# Patient Record
Sex: FEMALE | Race: WHITE | Employment: UNEMPLOYED | URBAN - METROPOLITAN AREA
[De-identification: names, ages, dates, MRNs, and addresses within clinical notes are randomized per-mention and may not be internally consistent; named-entity substitution may affect disease eponyms.]

---

## 2017-07-06 ENCOUNTER — OFFICE VISIT (OUTPATIENT)
Dept: FAMILY MEDICINE CLINIC | Age: 42
End: 2017-07-06

## 2017-07-06 VITALS
HEIGHT: 62 IN | DIASTOLIC BLOOD PRESSURE: 86 MMHG | HEART RATE: 65 BPM | WEIGHT: 205.4 LBS | OXYGEN SATURATION: 96 % | SYSTOLIC BLOOD PRESSURE: 120 MMHG | BODY MASS INDEX: 37.8 KG/M2

## 2017-07-06 DIAGNOSIS — Z23 NEED FOR PROPHYLACTIC VACCINATION AGAINST DIPHTHERIA-TETANUS-PERTUSSIS (DTP): ICD-10-CM

## 2017-07-06 DIAGNOSIS — K90.41 GLUTEN INTOLERANCE: ICD-10-CM

## 2017-07-06 DIAGNOSIS — L65.9 HAIR LOSS: ICD-10-CM

## 2017-07-06 DIAGNOSIS — E66.9 OBESITY, UNSPECIFIED OBESITY SEVERITY, UNSPECIFIED OBESITY TYPE: ICD-10-CM

## 2017-07-06 DIAGNOSIS — Z00.00 PHYSICAL EXAM, ROUTINE: Primary | ICD-10-CM

## 2017-07-06 DIAGNOSIS — Z12.31 SCREENING MAMMOGRAM, ENCOUNTER FOR: ICD-10-CM

## 2017-07-06 DIAGNOSIS — K21.9 GASTROESOPHAGEAL REFLUX DISEASE WITHOUT ESOPHAGITIS: ICD-10-CM

## 2017-07-06 DIAGNOSIS — E28.2 PCOS (POLYCYSTIC OVARIAN SYNDROME): ICD-10-CM

## 2017-07-06 DIAGNOSIS — F33.40 RECURRENT MAJOR DEPRESSIVE DISORDER, IN REMISSION (HCC): ICD-10-CM

## 2017-07-06 DIAGNOSIS — E78.5 HYPERLIPIDEMIA, UNSPECIFIED HYPERLIPIDEMIA TYPE: ICD-10-CM

## 2017-07-06 PROCEDURE — 90471 IMMUNIZATION ADMIN: CPT | Performed by: FAMILY MEDICINE

## 2017-07-06 PROCEDURE — 90715 TDAP VACCINE 7 YRS/> IM: CPT | Performed by: FAMILY MEDICINE

## 2017-07-06 PROCEDURE — 99385 PREV VISIT NEW AGE 18-39: CPT | Performed by: FAMILY MEDICINE

## 2017-07-06 RX ORDER — DESVENLAFAXINE 100 MG/1
TABLET, EXTENDED RELEASE ORAL
COMMUNITY
Start: 2017-06-13 | End: 2018-03-08 | Stop reason: SDUPTHER

## 2017-07-06 RX ORDER — SPIRONOLACTONE 50 MG/1
50 TABLET, FILM COATED ORAL
COMMUNITY
Start: 2017-02-21 | End: 2017-08-29 | Stop reason: SDUPTHER

## 2017-07-06 RX ORDER — CETIRIZINE HYDROCHLORIDE 10 MG/1
10 TABLET ORAL
COMMUNITY
End: 2022-02-18

## 2017-07-06 RX ORDER — TRAZODONE HYDROCHLORIDE 100 MG/1
100 TABLET ORAL
COMMUNITY
Start: 2017-01-03 | End: 2018-03-01 | Stop reason: SDUPTHER

## 2017-07-06 ASSESSMENT — PATIENT HEALTH QUESTIONNAIRE - PHQ9
SUM OF ALL RESPONSES TO PHQ QUESTIONS 1-9: 0
1. LITTLE INTEREST OR PLEASURE IN DOING THINGS: 0
2. FEELING DOWN, DEPRESSED OR HOPELESS: 0
SUM OF ALL RESPONSES TO PHQ9 QUESTIONS 1 & 2: 0

## 2017-08-29 ENCOUNTER — TELEPHONE (OUTPATIENT)
Dept: FAMILY MEDICINE CLINIC | Age: 42
End: 2017-08-29

## 2017-08-29 RX ORDER — SPIRONOLACTONE 50 MG/1
50 TABLET, FILM COATED ORAL DAILY
Qty: 30 TABLET | Refills: 2 | Status: SHIPPED | OUTPATIENT
Start: 2017-08-29 | End: 2017-09-05 | Stop reason: SDUPTHER

## 2017-09-05 RX ORDER — SPIRONOLACTONE 50 MG/1
50 TABLET, FILM COATED ORAL 2 TIMES DAILY
Qty: 180 TABLET | Refills: 0 | Status: SHIPPED | OUTPATIENT
Start: 2017-09-05 | End: 2017-12-05 | Stop reason: SDUPTHER

## 2017-12-05 RX ORDER — SPIRONOLACTONE 50 MG/1
50 TABLET, FILM COATED ORAL 2 TIMES DAILY
Qty: 180 TABLET | Refills: 1 | Status: SHIPPED | OUTPATIENT
Start: 2017-12-05 | End: 2018-05-27 | Stop reason: SDUPTHER

## 2018-01-08 ENCOUNTER — HOSPITAL ENCOUNTER (OUTPATIENT)
Dept: MAMMOGRAPHY | Age: 43
Discharge: HOME | End: 2018-01-08
Attending: FAMILY MEDICINE | Admitting: FAMILY MEDICINE

## 2018-01-08 DIAGNOSIS — Z12.31 SCREENING MAMMOGRAM, ENCOUNTER FOR: ICD-10-CM

## 2018-01-22 LAB
HCT VFR BLD CALC: 47.3 % (ref 36–48)
HEMOGLOBIN: 15.6 G/DL (ref 12–16)
MCH RBC QN AUTO: 31.7 PG (ref 26–34)
MCHC RBC AUTO-ENTMCNC: 32.9 G/DL (ref 31–36)
MCV RBC AUTO: 96.4 FL (ref 80–100)
PDW BLD-RTO: 13.9 % (ref 12.4–15.4)
PLATELET # BLD: 313 K/UL (ref 135–450)
PMV BLD AUTO: 8.1 FL (ref 5–10.5)
RBC # BLD: 4.91 M/UL (ref 4–5.2)
TSH SERPL DL<=0.05 MIU/L-ACNC: 1.64 UIU/ML (ref 0.27–4.2)
WBC # BLD: 11 K/UL (ref 4–11)

## 2018-01-24 LAB — TESTOSTERONE TOTAL: 41 NG/DL (ref 20–70)

## 2018-01-25 LAB
HPV COMMENT: NORMAL
HPV TYPE 16: NOT DETECTED
HPV TYPE 18: NOT DETECTED
HPVOH (OTHER TYPES): NOT DETECTED

## 2018-03-01 ENCOUNTER — TELEPHONE (OUTPATIENT)
Dept: FAMILY MEDICINE CLINIC | Age: 43
End: 2018-03-01

## 2018-03-01 RX ORDER — TRAZODONE HYDROCHLORIDE 100 MG/1
100 TABLET ORAL NIGHTLY
Qty: 30 TABLET | Refills: 0 | Status: SHIPPED | OUTPATIENT
Start: 2018-03-01 | End: 2018-03-28 | Stop reason: SDUPTHER

## 2018-03-08 ENCOUNTER — TELEPHONE (OUTPATIENT)
Dept: FAMILY MEDICINE CLINIC | Age: 43
End: 2018-03-08

## 2018-03-08 RX ORDER — DESVENLAFAXINE 100 MG/1
100 TABLET, EXTENDED RELEASE ORAL DAILY
Qty: 90 TABLET | Refills: 1 | Status: SHIPPED | OUTPATIENT
Start: 2018-03-08 | End: 2018-04-19 | Stop reason: SDUPTHER

## 2018-03-28 ENCOUNTER — TELEPHONE (OUTPATIENT)
Dept: FAMILY MEDICINE CLINIC | Age: 43
End: 2018-03-28

## 2018-03-28 RX ORDER — TRAZODONE HYDROCHLORIDE 100 MG/1
100 TABLET ORAL NIGHTLY
Qty: 30 TABLET | Refills: 0 | Status: SHIPPED | OUTPATIENT
Start: 2018-03-28 | End: 2018-04-19 | Stop reason: SDUPTHER

## 2018-04-05 ENCOUNTER — TELEPHONE (OUTPATIENT)
Dept: FAMILY MEDICINE CLINIC | Age: 43
End: 2018-04-05

## 2018-04-19 ENCOUNTER — OFFICE VISIT (OUTPATIENT)
Dept: FAMILY MEDICINE CLINIC | Age: 43
End: 2018-04-19

## 2018-04-19 VITALS
WEIGHT: 214.8 LBS | HEART RATE: 82 BPM | SYSTOLIC BLOOD PRESSURE: 132 MMHG | HEIGHT: 62 IN | DIASTOLIC BLOOD PRESSURE: 90 MMHG | OXYGEN SATURATION: 96 % | BODY MASS INDEX: 39.53 KG/M2

## 2018-04-19 DIAGNOSIS — F33.40 RECURRENT MAJOR DEPRESSIVE DISORDER, IN REMISSION (HCC): Primary | ICD-10-CM

## 2018-04-19 DIAGNOSIS — G47.00 INSOMNIA, UNSPECIFIED TYPE: ICD-10-CM

## 2018-04-19 DIAGNOSIS — E28.2 PCOS (POLYCYSTIC OVARIAN SYNDROME): ICD-10-CM

## 2018-04-19 DIAGNOSIS — E66.9 OBESITY WITHOUT SERIOUS COMORBIDITY, UNSPECIFIED CLASSIFICATION, UNSPECIFIED OBESITY TYPE: ICD-10-CM

## 2018-04-19 DIAGNOSIS — E78.5 HYPERLIPIDEMIA, UNSPECIFIED HYPERLIPIDEMIA TYPE: ICD-10-CM

## 2018-04-19 PROCEDURE — 99213 OFFICE O/P EST LOW 20 MIN: CPT | Performed by: FAMILY MEDICINE

## 2018-04-19 RX ORDER — TRAZODONE HYDROCHLORIDE 100 MG/1
100 TABLET ORAL NIGHTLY
Qty: 30 TABLET | Refills: 2 | Status: SHIPPED | OUTPATIENT
Start: 2018-04-19

## 2018-04-19 RX ORDER — DESVENLAFAXINE 100 MG/1
100 TABLET, EXTENDED RELEASE ORAL DAILY
Qty: 90 TABLET | Refills: 1 | Status: SHIPPED | OUTPATIENT
Start: 2018-04-19 | End: 2018-10-19 | Stop reason: SDUPTHER

## 2018-05-29 RX ORDER — SPIRONOLACTONE 50 MG/1
50 TABLET, FILM COATED ORAL 2 TIMES DAILY
Qty: 180 TABLET | Refills: 0 | Status: SHIPPED | OUTPATIENT
Start: 2018-05-29 | End: 2018-08-30 | Stop reason: SDUPTHER

## 2018-07-19 ENCOUNTER — HOSPITAL ENCOUNTER (OUTPATIENT)
Dept: OTHER | Age: 43
Discharge: HOME | End: 2018-07-19
Attending: FAMILY MEDICINE | Admitting: FAMILY MEDICINE

## 2018-07-19 DIAGNOSIS — E28.2 PCOS (POLYCYSTIC OVARIAN SYNDROME): ICD-10-CM

## 2018-07-19 DIAGNOSIS — E78.5 HYPERLIPIDEMIA, UNSPECIFIED HYPERLIPIDEMIA TYPE: ICD-10-CM

## 2018-07-19 LAB
A/G RATIO: 1.6 (ref 1.1–2.2)
ALBUMIN SERPL-MCNC: 4.3 G/DL (ref 3.4–5)
ALP BLD-CCNC: 53 U/L (ref 40–129)
ALT SERPL-CCNC: 9 U/L (ref 10–40)
ANION GAP SERPL CALCULATED.3IONS-SCNC: 12 MMOL/L (ref 3–16)
AST SERPL-CCNC: 15 U/L (ref 15–37)
BILIRUB SERPL-MCNC: <0.2 MG/DL (ref 0–1)
BUN BLDV-MCNC: 19 MG/DL (ref 7–20)
CALCIUM SERPL-MCNC: 9.3 MG/DL (ref 8.3–10.6)
CHLORIDE BLD-SCNC: 104 MMOL/L (ref 99–110)
CHOLESTEROL, FASTING: 154 MG/DL (ref 0–199)
CO2: 27 MMOL/L (ref 21–32)
CREAT SERPL-MCNC: 0.8 MG/DL (ref 0.6–1.1)
GFR AFRICAN AMERICAN: >60
GFR NON-AFRICAN AMERICAN: >60
GLOBULIN: 2.7 G/DL
GLUCOSE BLD-MCNC: 80 MG/DL (ref 70–99)
HDLC SERPL-MCNC: 47 MG/DL (ref 40–60)
LDL CHOLESTEROL CALCULATED: 93 MG/DL
POTASSIUM SERPL-SCNC: 4.5 MMOL/L (ref 3.5–5.1)
SODIUM BLD-SCNC: 143 MMOL/L (ref 136–145)
TOTAL PROTEIN: 7 G/DL (ref 6.4–8.2)
TRIGLYCERIDE, FASTING: 72 MG/DL (ref 0–150)
TSH REFLEX: 3.97 UIU/ML (ref 0.27–4.2)
VLDLC SERPL CALC-MCNC: 14 MG/DL

## 2018-07-31 ENCOUNTER — OFFICE VISIT (OUTPATIENT)
Dept: FAMILY MEDICINE CLINIC | Age: 43
End: 2018-07-31

## 2018-07-31 VITALS
OXYGEN SATURATION: 97 % | SYSTOLIC BLOOD PRESSURE: 112 MMHG | BODY MASS INDEX: 39.16 KG/M2 | DIASTOLIC BLOOD PRESSURE: 88 MMHG | HEIGHT: 62 IN | HEART RATE: 80 BPM | WEIGHT: 212.8 LBS

## 2018-07-31 DIAGNOSIS — E66.9 OBESITY WITHOUT SERIOUS COMORBIDITY, UNSPECIFIED CLASSIFICATION, UNSPECIFIED OBESITY TYPE: ICD-10-CM

## 2018-07-31 DIAGNOSIS — F33.40 RECURRENT MAJOR DEPRESSIVE DISORDER, IN REMISSION (HCC): ICD-10-CM

## 2018-07-31 DIAGNOSIS — E28.2 PCOS (POLYCYSTIC OVARIAN SYNDROME): ICD-10-CM

## 2018-07-31 DIAGNOSIS — L30.4 INTERTRIGO: ICD-10-CM

## 2018-07-31 DIAGNOSIS — Z00.00 PHYSICAL EXAM, ROUTINE: Primary | ICD-10-CM

## 2018-07-31 PROCEDURE — 99396 PREV VISIT EST AGE 40-64: CPT | Performed by: FAMILY MEDICINE

## 2018-07-31 RX ORDER — NYSTATIN 100000 [USP'U]/G
POWDER TOPICAL
Qty: 1 BOTTLE | Refills: 3 | Status: SHIPPED | OUTPATIENT
Start: 2018-07-31 | End: 2019-10-09

## 2018-07-31 RX ORDER — BUPROPION HYDROCHLORIDE 150 MG/1
TABLET ORAL
COMMUNITY
Start: 2018-07-24 | End: 2019-10-09 | Stop reason: DRUGHIGH

## 2018-07-31 NOTE — PROGRESS NOTES
Lipid panel:   Lab Results   Component Value Date    HDL 47 07/19/2018    LDLCALC 93 07/19/2018        Immunization History   Administered Date(s) Administered    DT 11/13/2014    Tdap (Boostrix, Adacel) 07/06/2017       Allergies   Allergen Reactions    Lamotrigine Rash    Penicillins Hives and Rash    Sulfa Antibiotics Rash    Gluten Meal     Penciclovir Rash     Outpatient Prescriptions Marked as Taking for the 7/31/18 encounter (Office Visit) with Jose Spangler MD   Medication Sig Dispense Refill    buPROPion (WELLBUTRIN XL) 150 MG extended release tablet       spironolactone (ALDACTONE) 50 MG tablet TAKE 1 TABLET BY MOUTH 2 TIMES DAILY 180 tablet 0    traZODone (DESYREL) 100 MG tablet Take 1 tablet by mouth nightly 30 tablet 2    desvenlafaxine succinate (PRISTIQ) 100 MG TB24 extended release tablet Take 1 tablet by mouth daily 90 tablet 1    cetirizine (ZYRTEC) 10 MG tablet Take 10 mg by mouth         No past medical history on file. Past Surgical History:   Procedure Laterality Date    INCONTINENCE SURGERY      OVARIAN CYST REMOVAL Right     PLANTAR FASCIA SURGERY Bilateral      Family History   Problem Relation Age of Onset    High Blood Pressure Mother     Thyroid Disease Mother     Anxiety Disorder Mother     Liver Disease Father 61    Diabetes Father     Colon Cancer Maternal Grandmother         Mid 45s    Heart Disease Maternal Grandfather     Diabetes Maternal Grandfather     Heart Disease Paternal Grandmother     Diabetes Paternal Grandmother     Breast Cancer Paternal Grandmother 61    Breast Cancer Paternal Aunt 72    Other Daughter         High functioning autism and ADHD     Social History     Social History    Marital status:      Spouse name: N/A    Number of children: N/A    Years of education: N/A     Occupational History    Not on file.      Social History Main Topics    Smoking status: Former Smoker     Packs/day: 1.00     Years: 8.00 erythema in axillary creases bilaterally. No suspicious lesions noted. Psychiatric: She has a normal mood and affect. Her speech is normal and behavior is normal. Judgment, cognition and memory are normal.     Assessment/Plan:    Ivet Ortega was seen today for annual exam.    Diagnoses and all orders for this visit:    Physical exam, routine    Recurrent major depressive disorder, in remission ([facility] [address]    PCOS (polycystic ovarian syndrome)    Obesity without serious comorbidity, unspecified classification, unspecified obesity type    Intertrigo  -     nystatin (MYCOSTATIN) 660771 UNIT/GM powder; Apply 3 times daily. Labs 7/19/2018 reviewed with patient.

## 2018-08-30 RX ORDER — SPIRONOLACTONE 50 MG/1
TABLET, FILM COATED ORAL
Qty: 180 TABLET | Refills: 3 | Status: SHIPPED | OUTPATIENT
Start: 2018-08-30 | End: 2019-09-09 | Stop reason: SDUPTHER

## 2018-10-19 DIAGNOSIS — F33.40 RECURRENT MAJOR DEPRESSIVE DISORDER, IN REMISSION (HCC): Primary | ICD-10-CM

## 2018-10-19 RX ORDER — DESVENLAFAXINE 100 MG/1
100 TABLET, EXTENDED RELEASE ORAL DAILY
Qty: 90 TABLET | Refills: 2 | Status: SHIPPED | OUTPATIENT
Start: 2018-10-19 | End: 2022-02-18

## 2019-03-12 ENCOUNTER — HOSPITAL ENCOUNTER (OUTPATIENT)
Dept: WOMENS IMAGING | Age: 44
Discharge: HOME | End: 2019-03-12
Payer: COMMERCIAL

## 2019-03-12 DIAGNOSIS — Z12.31 BREAST CANCER SCREENING BY MAMMOGRAM: ICD-10-CM

## 2019-03-12 PROCEDURE — 77063 BREAST TOMOSYNTHESIS BI: CPT

## 2019-09-09 ENCOUNTER — TELEPHONE (OUTPATIENT)
Dept: FAMILY MEDICINE CLINIC | Age: 44
End: 2019-09-09

## 2019-09-09 RX ORDER — SPIRONOLACTONE 50 MG/1
TABLET, FILM COATED ORAL
Qty: 180 TABLET | Refills: 1 | Status: SHIPPED | OUTPATIENT
Start: 2019-09-09 | End: 2019-09-09 | Stop reason: SDUPTHER

## 2019-09-09 RX ORDER — SPIRONOLACTONE 50 MG/1
TABLET, FILM COATED ORAL
Qty: 60 TABLET | Refills: 0 | Status: CANCELLED | OUTPATIENT
Start: 2019-09-09

## 2019-09-09 RX ORDER — SPIRONOLACTONE 50 MG/1
TABLET, FILM COATED ORAL
Qty: 180 TABLET | Refills: 1 | Status: SHIPPED | OUTPATIENT
Start: 2019-09-09 | End: 2020-03-13 | Stop reason: SDUPTHER

## 2019-10-09 ENCOUNTER — OFFICE VISIT (OUTPATIENT)
Dept: FAMILY MEDICINE CLINIC | Age: 44
End: 2019-10-09
Payer: COMMERCIAL

## 2019-10-09 VITALS
DIASTOLIC BLOOD PRESSURE: 90 MMHG | BODY MASS INDEX: 39.05 KG/M2 | HEIGHT: 62 IN | HEART RATE: 84 BPM | OXYGEN SATURATION: 98 % | WEIGHT: 212.2 LBS | SYSTOLIC BLOOD PRESSURE: 120 MMHG

## 2019-10-09 DIAGNOSIS — F33.40 RECURRENT MAJOR DEPRESSIVE DISORDER, IN REMISSION (HCC): ICD-10-CM

## 2019-10-09 DIAGNOSIS — Z00.00 ANNUAL PHYSICAL EXAM: Primary | ICD-10-CM

## 2019-10-09 DIAGNOSIS — E28.2 POLYCYSTIC OVARIAN SYNDROME: ICD-10-CM

## 2019-10-09 PROCEDURE — 99386 PREV VISIT NEW AGE 40-64: CPT | Performed by: FAMILY MEDICINE

## 2019-10-09 RX ORDER — BUPROPION HYDROCHLORIDE 300 MG/1
TABLET ORAL
COMMUNITY
Start: 2019-07-24 | End: 2020-08-25

## 2019-11-04 LAB
ALBUMIN SERPL-MCNC: 4.2 G/DL
ALP BLD-CCNC: 61 U/L
ALT SERPL-CCNC: 11 U/L
ANION GAP SERPL CALCULATED.3IONS-SCNC: 1.7 MMOL/L
AST SERPL-CCNC: 15 U/L
AVERAGE GLUCOSE: NORMAL
BASOPHILS ABSOLUTE: 0 /ΜL
BASOPHILS RELATIVE PERCENT: 0 %
BILIRUB SERPL-MCNC: 0.3 MG/DL (ref 0.1–1.4)
BUN BLDV-MCNC: 12 MG/DL
CALCIUM SERPL-MCNC: 9.4 MG/DL
CHLORIDE BLD-SCNC: 105 MMOL/L
CHOLESTEROL, TOTAL: 144 MG/DL
CHOLESTEROL/HDL RATIO: NORMAL
CO2: 23 MMOL/L
CREAT SERPL-MCNC: 0.92 MG/DL
EOSINOPHILS ABSOLUTE: 0.1 /ΜL
EOSINOPHILS RELATIVE PERCENT: 1 %
GFR CALCULATED: NORMAL
GLUCOSE BLD-MCNC: 86 MG/DL
HBA1C MFR BLD: 5.3 %
HCT VFR BLD CALC: 46.5 % (ref 36–46)
HDLC SERPL-MCNC: 48 MG/DL (ref 35–70)
HEMOGLOBIN: 15.8 G/DL (ref 12–16)
LDL CHOLESTEROL CALCULATED: 73 MG/DL (ref 0–160)
LYMPHOCYTES ABSOLUTE: 1.6 /ΜL
LYMPHOCYTES RELATIVE PERCENT: 16 %
MCH RBC QN AUTO: 32.4 PG
MCHC RBC AUTO-ENTMCNC: 34 G/DL
MCV RBC AUTO: 96 FL
MONOCYTES ABSOLUTE: 0.8 /ΜL
MONOCYTES RELATIVE PERCENT: 8 %
NEUTROPHILS ABSOLUTE: 7.5 /ΜL
NEUTROPHILS RELATIVE PERCENT: 75 %
PLATELET # BLD: 309 K/ΜL
PMV BLD AUTO: ABNORMAL FL
POTASSIUM SERPL-SCNC: 4.6 MMOL/L
RBC # BLD: 4.87 10^6/ΜL
SODIUM BLD-SCNC: 145 MMOL/L
TOTAL PROTEIN: 6.7
TRIGL SERPL-MCNC: 117 MG/DL
VLDLC SERPL CALC-MCNC: 23 MG/DL
WBC # BLD: 10.1 10^3/ML

## 2020-01-09 ENCOUNTER — OFFICE VISIT (OUTPATIENT)
Dept: FAMILY MEDICINE CLINIC | Age: 45
End: 2020-01-09
Payer: COMMERCIAL

## 2020-01-09 VITALS
DIASTOLIC BLOOD PRESSURE: 86 MMHG | BODY MASS INDEX: 38.46 KG/M2 | OXYGEN SATURATION: 98 % | HEART RATE: 84 BPM | SYSTOLIC BLOOD PRESSURE: 120 MMHG | TEMPERATURE: 98.1 F | WEIGHT: 212 LBS

## 2020-01-09 PROCEDURE — 99213 OFFICE O/P EST LOW 20 MIN: CPT | Performed by: FAMILY MEDICINE

## 2020-01-09 RX ORDER — FLUTICASONE PROPIONATE 50 MCG
1 SPRAY, SUSPENSION (ML) NASAL DAILY
Qty: 1 BOTTLE | Refills: 1 | Status: SHIPPED | OUTPATIENT
Start: 2020-01-09 | End: 2021-05-10

## 2020-01-09 RX ORDER — AZITHROMYCIN 250 MG/1
TABLET, FILM COATED ORAL
Qty: 6 TABLET | Refills: 0 | Status: SHIPPED | OUTPATIENT
Start: 2020-01-09 | End: 2020-08-25 | Stop reason: ALTCHOICE

## 2020-01-09 NOTE — PROGRESS NOTES
[address] Note    Date: 1/9/2020                                               Subjective/Objective:     Chief Complaint   Patient presents with    Fever         Sinusitis     yellow muscus    Fatigue       HPI   4 day hx of fatigue, sinus pressure, cough. +fever up to 101. Stable in severity. Patient Active Problem List    Diagnosis Date Noted    PCOS (polycystic ovarian syndrome) 07/06/2017    Gluten intolerance 07/06/2017    Recurrent major depressive disorder, in remission ([facility] [address] 07/06/2017    Hair loss 07/06/2017    Gastroesophageal reflux disease without esophagitis 07/06/2017    Hyperlipidemia 07/06/2017    Obesity 07/06/2017       Past Medical History:   Diagnosis Date    Depression     Female pattern hair loss        Current Outpatient Medications   Medication Sig Dispense Refill    azithromycin (ZITHROMAX Z-YOVANI) 250 MG tablet Take 2 tablets on day one and 1 tablet daily on days 2-5 6 tablet 0    fluticasone (FLONASE) 50 MCG/ACT nasal spray 1 spray by Nasal route daily 1 Bottle 1    buPROPion (WELLBUTRIN XL) 300 MG extended release tablet       Biotin 5000 MCG CAPS Take 1 capsule by mouth daily      Ferrous Gluconate (IRON 27 PO) Take 1 tablet by mouth daily      spironolactone (ALDACTONE) 50 MG tablet TAKE 1 TABLET BY MOUTH TWICE A  tablet 1    desvenlafaxine succinate (PRISTIQ) 100 MG TB24 extended release tablet TAKE 1 TABLET BY MOUTH DAILY 90 tablet 2    traZODone (DESYREL) 100 MG tablet Take 1 tablet by mouth nightly (Patient taking differently: Take 50 mg by mouth nightly ) 30 tablet 2    cetirizine (ZYRTEC) 10 MG tablet Take 10 mg by mouth       No current facility-administered medications for this visit.         Allergies   Allergen Reactions    Lamotrigine Rash    Penicillins Hives and Rash    Sulfa Antibiotics Rash    Gluten Meal     Penciclovir Rash       Review of Systems   No SOB, no vomiting    Vitals:  /86   Pulse 84 Temp 98.1 °F (36.7 °C)   Wt 212 lb (96.2 kg)   SpO2 98%   BMI 38.46 kg/m²     Physical Exam   General:  +acutely ill, NAD, alert, non-toxic  HEENT:  Normocephalic, atraumatic. Pupils equal and round. Moist mucous membranes. Normal dentition. No pharyngeal erythema, no exudates  NECK:  Supple, normal range of motion, no LAD, no meningeal signs  CHEST/LUNGS: CTAB, no crackles, no wheeze, no rhonchi. Symmetric rise  CARDIOVASCULAR: RRR,  no murmur, no rub, pulses 2+  EXTREMETIES: Normal movement of all extremities. No edema. No joint swelling. NEURO:  GCS 15, CN2-12 grossly intact, no focal motor/sensory deficits, no cerebellar deficits, normal gait, normal speech      Assessment/Plan     45 y. o.yo female with acute sinusitis. Will treat with Azithromycin, flonase, anti-histamine. Discussed with patient medication/s dosage, instructions, potential S/E, A/R and Drug Interaction  Educational material provided regarding patient's condition  Tylenol or Motrin as needed for pain or fever  Advise to return here if worse or go to nearest ER  Encourage fluids  Pt discharged in stable condition at 10:27      1. Acute bacterial sinusitis    - azithromycin (ZITHROMAX Z-YOVANI) 250 MG tablet; Take 2 tablets on day one and 1 tablet daily on days 2-5  Dispense: 6 tablet; Refill: 0  - fluticasone (FLONASE) 50 MCG/ACT nasal spray; 1 spray by Nasal route daily  Dispense: 1 Bottle; Refill: 1    2. Fever, unspecified         No orders of the defined types were placed in this encounter. No follow-ups on file.     Pasquale Edmond MD    1/9/2020  10:27 AM

## 2020-02-14 ENCOUNTER — OFFICE VISIT (OUTPATIENT)
Dept: FAMILY MEDICINE CLINIC | Age: 45
End: 2020-02-14
Payer: COMMERCIAL

## 2020-02-14 VITALS
OXYGEN SATURATION: 97 % | DIASTOLIC BLOOD PRESSURE: 88 MMHG | TEMPERATURE: 97.9 F | WEIGHT: 216 LBS | SYSTOLIC BLOOD PRESSURE: 120 MMHG | HEART RATE: 88 BPM | BODY MASS INDEX: 39.75 KG/M2 | HEIGHT: 62 IN

## 2020-02-14 PROCEDURE — 99213 OFFICE O/P EST LOW 20 MIN: CPT | Performed by: FAMILY MEDICINE

## 2020-02-14 RX ORDER — CEPHALEXIN 500 MG/1
500 CAPSULE ORAL 3 TIMES DAILY
Qty: 21 CAPSULE | Refills: 0 | Status: SHIPPED | OUTPATIENT
Start: 2020-02-14 | End: 2020-02-21

## 2020-02-14 NOTE — PROGRESS NOTES
Marquis Collins is a 45 y.o. female. HPI:  Here with concern about infected area occipital area of scalp  Has been draining oozy pussy bloody fluid for for 5 days  No fever  Somewhat uncomfortable  Meds, vitamins and allergies reviewed with pt    Patient is not convinced that she is really allergic to penicillin  Wt Readings from Last 3 Encounters:   02/14/20 216 lb (98 kg)   01/09/20 212 lb (96.2 kg)   10/09/19 212 lb 3.2 oz (96.3 kg)       REVIEW OF SYSTEMS:   CONSTITUTIONAL: See history of present illness,   Weight noted   HEENT: No new vision difficulties or ringing in the ears. RESPIRATORY: No new SOB, PND, orthopnea or cough. CARDIOVASCULAR: no CP, palpitations or SOB with exertion  GI: No nausea, vomiting, diarrhea, constipation, abdominal pain or changes in bowel habits. : No urinary frequency, urgency, incontinence hematuria or dysuria. SKIN: See HPI  MUSCULOSKELETAL: No new muscle or joint pain. NEUROLOGICAL: No syncope or TIA-like symptoms. PSYCHIATRIC: No anxiety, insomnia or depression     Allergies   Allergen Reactions    Lamotrigine Rash    Penicillins Hives and Rash    Sulfa Antibiotics Rash    Gluten Meal     Penciclovir Rash       Prior to Visit Medications    Medication Sig Taking? Authorizing Provider   mupirocin (BACTROBAN) 2 % ointment Apply 3 times daily.  Yes Melva Zavala MD   cephALEXin (KEFLEX) 500 MG capsule Take 1 capsule by mouth 3 times daily for 7 days Yes Melva Zavala MD   azithromycin (ZITHROMAX Z-YOVANI) 250 MG tablet Take 2 tablets on day one and 1 tablet daily on days 2-5 Yes Fernanda Shelley MD   fluticasone (FLONASE) 50 MCG/ACT nasal spray 1 spray by Nasal route daily Yes Fernanda Shelley MD   buPROPion (WELLBUTRIN XL) 300 MG extended release tablet  Yes Historical Provider, MD   Biotin 5000 MCG CAPS Take 1 capsule by mouth daily Yes Historical Provider, MD   Ferrous Gluconate (IRON 27 PO) Take 1 tablet by mouth daily Yes Historical Provider, MD spironolactone (ALDACTONE) 50 MG tablet TAKE 1 TABLET BY MOUTH TWICE A DAY Yes Hernesto Barry MD   desvenlafaxine succinate (PRISTIQ) 100 MG TB24 extended release tablet TAKE 1 TABLET BY MOUTH DAILY Yes Justin Palma MD   traZODone (DESYREL) 100 MG tablet Take 1 tablet by mouth nightly  Patient taking differently: Take 50 mg by mouth nightly  Yes Justin Palma MD   cetirizine (ZYRTEC) 10 MG tablet Take 10 mg by mouth Yes Historical Provider, MD       Past Medical History:   Diagnosis Date    Depression     Female pattern hair loss        Social History     Tobacco Use    Smoking status: Former Smoker     Packs/day: 1.00     Years: 8.00     Pack years: 8.00     Types: Cigarettes    Smokeless tobacco: Former User     Quit date: 1/1/1998   Substance Use Topics    Alcohol use: No       Family History   Problem Relation Age of Onset    High Blood Pressure Mother     Thyroid Disease Mother     Anxiety Disorder Mother     Liver Disease Father 61    Diabetes Father     Colon Cancer Maternal Grandmother         Mid 45s    Heart Disease Maternal Grandfather     Diabetes Maternal Grandfather     Heart Disease Paternal Grandmother     Diabetes Paternal Grandmother     Breast Cancer Paternal Grandmother 61    Breast Cancer Paternal Aunt 72    Other Daughter         High functioning autism and ADHD       OBJECTIVE:  /88   Pulse 88   Temp 97.9 °F (36.6 °C)   Ht 5' 2.25\" (1.581 m)   Wt 216 lb (98 kg)   SpO2 97%   BMI 39.19 kg/m²   GEN:  in NAD  Occipital scalp shows grape-sized area of serosanguineous drainage, no induration, minimally tender  HEENT:  NCAT, TMs:normal and throat: clear  NECK:  Supple without adenopathy. CV:  Regular rate and rhythm, S1 and S2 normal, no murmurs, clicks  PULM:  Chest is clear, no wheezing ,  symmetric air entry throughout both lung fields.   ABD: Soft, NT, masses appreciated  EXT: No rash or edema  NEURO: Alert oriented ×3, nonfocal

## 2020-03-09 RX ORDER — SPIRONOLACTONE 50 MG/1
TABLET, FILM COATED ORAL
Qty: 180 TABLET | Refills: 0 | OUTPATIENT
Start: 2020-03-09

## 2020-03-12 RX ORDER — SPIRONOLACTONE 50 MG/1
TABLET, FILM COATED ORAL
Qty: 180 TABLET | Refills: 0 | OUTPATIENT
Start: 2020-03-12

## 2020-03-13 RX ORDER — SPIRONOLACTONE 50 MG/1
TABLET, FILM COATED ORAL
Qty: 180 TABLET | Refills: 1 | Status: SHIPPED | OUTPATIENT
Start: 2020-03-13 | End: 2020-09-03

## 2020-03-20 ENCOUNTER — HOSPITAL ENCOUNTER (OUTPATIENT)
Dept: WOMENS IMAGING | Age: 45
Discharge: HOME | End: 2020-03-20
Payer: COMMERCIAL

## 2020-03-20 PROCEDURE — 77063 BREAST TOMOSYNTHESIS BI: CPT

## 2020-08-25 ENCOUNTER — HOSPITAL ENCOUNTER (OUTPATIENT)
Age: 45
Discharge: HOME | End: 2020-08-25
Payer: COMMERCIAL

## 2020-08-25 ENCOUNTER — OFFICE VISIT (OUTPATIENT)
Dept: ENDOCRINOLOGY | Age: 45
End: 2020-08-25
Payer: COMMERCIAL

## 2020-08-25 VITALS
TEMPERATURE: 97.9 F | WEIGHT: 218.8 LBS | BODY MASS INDEX: 40.27 KG/M2 | RESPIRATION RATE: 16 BRPM | DIASTOLIC BLOOD PRESSURE: 90 MMHG | HEIGHT: 62 IN | HEART RATE: 90 BPM | SYSTOLIC BLOOD PRESSURE: 135 MMHG

## 2020-08-25 PROCEDURE — 80299 QUANTITATIVE ASSAY DRUG: CPT

## 2020-08-25 PROCEDURE — 99204 OFFICE O/P NEW MOD 45 MIN: CPT | Performed by: INTERNAL MEDICINE

## 2020-08-25 NOTE — PATIENT INSTRUCTIONS
of information for questions and concerns related to your medical problem. Patient Preparation for salivary cortisol :  1. Do not brush teeth before collecting specimen. 2. Do not eat or drink for 15 minutes prior to specimen collection. Collection Instructions:  1. Collect specimen between 11 p.m. 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 2. To use the Salivette:    a. Remove top cap of container to expose swab. b. Place swab directly into mouth by tipping container so swab falls into mouth. Do not touch swab with fingers. c. Keep swab in mouth for approximately 2 minutes. Roll swab in mouth, do not chew swab.  d. Place swab back into its container without touching, and replace the cap. 3. Record collection time, and send appropriately labeled Salivette to laboratory.

## 2020-08-25 NOTE — PROGRESS NOTES
SUBJECTIVE:  Alis Carrasco is a 45 y.o. female who is referred here for possible cushings disease . She was evaluated 7 years ago for possible Cushing disease and had some salivary testing which was abnormal according to the patient ---testing done in [facility] and she has no records of that. Symptoms of  weight gain , abdominal obesity, inability to sleep , inability to lose weight , hair loss , facial plethora , fatigue ---she has more energy at night , she has dry skin and also has buffalo hump for years   She has been diagnosed with PCOS and had irregular periods and had to undergo fertility treatments for her 2 pregnancies . Currently on spironolactone   She has severe depression and has ECT in the past she is on Pristiq and Trazadone. She has low libido . She has been diagnosed with Gluten sensitivity and does gluten free for 7 years . Denies use of any steroids. She has been obese most of her adult life. She finds hard to lose weight despite reducing carbohydrates to a minimal amount. Her blood pressure was elevated today does not carry the diagnosis of hypertension. She was advised to check it at home keeper journal for her for blood pressure and send it to primary care physician or me.         Past Medical History:   Diagnosis Date    Depression     Female pattern hair loss     Insulin resistance     Obesity     PCOS (polycystic ovarian syndrome)      Patient Active Problem List    Diagnosis Date Noted    PCOS (polycystic ovarian syndrome) 07/06/2017    Gluten intolerance 07/06/2017    Recurrent major depressive disorder, in remission ([facility] [address] 07/06/2017    Hair loss 07/06/2017    Gastroesophageal reflux disease without esophagitis 07/06/2017    Hyperlipidemia 07/06/2017    Obesity 07/06/2017     Past Surgical History:   Procedure Laterality Date    INCONTINENCE SURGERY      OVARIAN CYST REMOVAL Right     PLANTAR FASCIA SURGERY Bilateral      Family History   Problem Relation normal with no lesions, the voice quality was normal  Neck: neck is supple and symmetric, with midline trachea and no masses, thyroid is normal difficult to palpate due to body habitus  Lymphatics: normal cervical lymph nodes, normal supraclavicular nodes  Pulmonary: no increased work of breathing or signs of respiratory distress, lungs are clear to auscultation  Cardiovascular: normal heart rate and rhythm, normal S1 and S2, no murmurs   Abdomen: abdomen is soft, non-tender with no masses  Musculoskeletal: normal gait and station. Neurological: normal coordination, normal general cortical function    Lab Review:  Lab Results   Component Value Date    TSH 1.64 01/22/2018     No results found for: T4FREE     ASSESSMENT/PLAN:  --- Abnormal weight gain  Patient clinically does appear cushingoid with facial plethora, buffalo hump and moon facies. It was explained to the patient that some of these features could be from having generalized obesity. She does have severe depression, needing ECT in the past and severe polycystic ovarian syndrome both of which can cause pseudo-Cushing's disease. This was explained to the patient in detail. She will first get her urine screen for synthetic steroids and then will do further work-up. If negative     - Miscellaneous Sendout 1; Future  - ACTH; Future  - Cortisol AM, Total; Future  - DHEA-Sulfate; Future  - T4, Free; Future  - Anti-Thyroglobulin Antibody; Future  - Thyroid Peroxidase Antibody; Future  - TSH without Reflex; Future  - T3; Future  - Comprehensive Metabolic Panel; Future  - Cortisol, Urine, Free; Future  - SALIVARY CORTISOL; Future  - SALIVARY CORTISOL;  Future    --- PCOS (polycystic ovarian syndrome)  Patient needed IVF for HER-2 pregnancies  She is currently on spironolactone    --- Gluten intolerance  She has been gluten free for 7 years    --- Recurrent major depressive disorder,   Patient is on trazodone and Pristiq    --- Hair loss  This has been an ongoing problem will rule out thyroid issues  - SALIVARY CORTISOL; Future  - SALIVARY CORTISOL; Future      Reviewed and/or ordered clinical lab results Yes  Reviewed and/or ordered radiology tests Yes   Reviewed and/or ordered other diagnostic tests Yes  Made a decision to obtain old records Yes  Reviewed and summarized old records Yes      Stephanie Rivera was counseled regarding symptoms of current diagnosis, course and complications of disease if inadequately treated, side effects of medications, diagnosis, treatment options, and prognosis, risks, benefits, complications, and alternatives of treatment, labs, imaging and other studies and treatment targets and goals. She understands instructions and counseling. Total time spent 48 minutes , more than 50 % if this time was spent on face to face counseling. Return in about 4 weeks (around 9/22/2020). Please note that some or all of this report was generated using voice recognition software. Please notify me in case of any questions about the content of this document, as some errors in transcription may have occurred .

## 2020-09-02 ENCOUNTER — TELEPHONE (OUTPATIENT)
Dept: ENDOCRINOLOGY | Age: 45
End: 2020-09-02

## 2020-09-04 LAB
Lab: NORMAL
REPORT: NORMAL
THIS TEST SENT TO: NORMAL

## 2020-09-09 ENCOUNTER — HOSPITAL ENCOUNTER (OUTPATIENT)
Age: 45
Discharge: HOME | End: 2020-09-09
Payer: COMMERCIAL

## 2020-09-09 PROCEDURE — 82533 TOTAL CORTISOL: CPT

## 2020-09-10 ENCOUNTER — HOSPITAL ENCOUNTER (OUTPATIENT)
Age: 45
Discharge: HOME | End: 2020-09-10
Payer: COMMERCIAL

## 2020-09-10 PROCEDURE — 82533 TOTAL CORTISOL: CPT

## 2020-09-11 ENCOUNTER — HOSPITAL ENCOUNTER (OUTPATIENT)
Age: 45
Discharge: HOME | End: 2020-09-11
Payer: COMMERCIAL

## 2020-09-11 ENCOUNTER — HOSPITAL ENCOUNTER (EMERGENCY)
Age: 45
Discharge: HOME | End: 2020-09-11
Attending: EMERGENCY MEDICINE
Payer: COMMERCIAL

## 2020-09-11 ENCOUNTER — APPOINTMENT (OUTPATIENT)
Dept: CT IMAGING | Age: 45
End: 2020-09-11
Payer: COMMERCIAL

## 2020-09-11 ENCOUNTER — APPOINTMENT (OUTPATIENT)
Dept: GENERAL RADIOLOGY | Age: 45
End: 2020-09-11
Payer: COMMERCIAL

## 2020-09-11 VITALS
HEIGHT: 62 IN | OXYGEN SATURATION: 98 % | HEART RATE: 98 BPM | BODY MASS INDEX: 38.64 KG/M2 | TEMPERATURE: 97.6 F | RESPIRATION RATE: 17 BRPM | SYSTOLIC BLOOD PRESSURE: 149 MMHG | DIASTOLIC BLOOD PRESSURE: 87 MMHG | WEIGHT: 210 LBS

## 2020-09-11 LAB
A/G RATIO: 1.6 (ref 1.1–2.2)
ALBUMIN SERPL-MCNC: 4.1 G/DL (ref 3.4–5)
ALP BLD-CCNC: 52 U/L (ref 40–129)
ALT SERPL-CCNC: 9 U/L (ref 10–40)
AMPHETAMINE SCREEN, URINE: NORMAL
ANION GAP SERPL CALCULATED.3IONS-SCNC: 14 MMOL/L (ref 3–16)
ANION GAP SERPL CALCULATED.3IONS-SCNC: 16 MMOL/L (ref 3–16)
ANTI-THYROGLOB ABS: 12 IU/ML
AST SERPL-CCNC: 14 U/L (ref 15–37)
BACTERIA: ABNORMAL /HPF
BARBITURATE SCREEN URINE: NORMAL
BASOPHILS ABSOLUTE: 0.1 K/UL (ref 0–0.2)
BASOPHILS RELATIVE PERCENT: 0.9 %
BENZODIAZEPINE SCREEN, URINE: NORMAL
BILIRUB SERPL-MCNC: 0.3 MG/DL (ref 0–1)
BILIRUBIN URINE: NEGATIVE
BLOOD, URINE: NEGATIVE
BUN BLDV-MCNC: 15 MG/DL (ref 7–20)
BUN BLDV-MCNC: 15 MG/DL (ref 7–20)
CALCIUM SERPL-MCNC: 9.4 MG/DL (ref 8.3–10.6)
CALCIUM SERPL-MCNC: 9.5 MG/DL (ref 8.3–10.6)
CANNABINOID SCREEN URINE: NORMAL
CHLORIDE BLD-SCNC: 103 MMOL/L (ref 99–110)
CHLORIDE BLD-SCNC: 106 MMOL/L (ref 99–110)
CLARITY: CLEAR
CO2: 19 MMOL/L (ref 21–32)
CO2: 22 MMOL/L (ref 21–32)
COCAINE METABOLITE SCREEN URINE: NORMAL
COLOR: YELLOW
CORTISOL - AM: 18.3 UG/DL (ref 4.3–22.4)
CREAT SERPL-MCNC: 0.7 MG/DL (ref 0.6–1.1)
CREAT SERPL-MCNC: 0.8 MG/DL (ref 0.6–1.1)
EKG ATRIAL RATE: 94 BPM
EKG DIAGNOSIS: NORMAL
EKG P AXIS: 49 DEGREES
EKG P-R INTERVAL: 152 MS
EKG Q-T INTERVAL: 336 MS
EKG QRS DURATION: 90 MS
EKG QTC CALCULATION (BAZETT): 420 MS
EKG R AXIS: 0 DEGREES
EKG T AXIS: 24 DEGREES
EKG VENTRICULAR RATE: 94 BPM
EOSINOPHILS ABSOLUTE: 0.1 K/UL (ref 0–0.6)
EOSINOPHILS RELATIVE PERCENT: 0.5 %
EPITHELIAL CELLS, UA: 3 /HPF (ref 0–5)
ETHANOL: NORMAL MG/DL (ref 0–0.08)
GFR AFRICAN AMERICAN: >60
GFR AFRICAN AMERICAN: >60
GFR NON-AFRICAN AMERICAN: >60
GFR NON-AFRICAN AMERICAN: >60
GLOBULIN: 2.5 G/DL
GLUCOSE BLD-MCNC: 114 MG/DL (ref 70–99)
GLUCOSE BLD-MCNC: 129 MG/DL (ref 70–99)
GLUCOSE BLD-MCNC: 87 MG/DL (ref 70–99)
GLUCOSE URINE: NEGATIVE MG/DL
HCG QUALITATIVE: NEGATIVE
HCT VFR BLD CALC: 48.1 % (ref 36–48)
HEMOGLOBIN: 16.1 G/DL (ref 12–16)
HYALINE CASTS: 2 /LPF (ref 0–8)
KETONES, URINE: NEGATIVE MG/DL
LEUKOCYTE ESTERASE, URINE: NEGATIVE
LYMPHOCYTES ABSOLUTE: 1.7 K/UL (ref 1–5.1)
LYMPHOCYTES RELATIVE PERCENT: 10.8 %
Lab: NORMAL
MCH RBC QN AUTO: 32.6 PG (ref 26–34)
MCHC RBC AUTO-ENTMCNC: 33.4 G/DL (ref 31–36)
MCV RBC AUTO: 97.7 FL (ref 80–100)
METHADONE SCREEN, URINE: NORMAL
MICROSCOPIC EXAMINATION: YES
MONOCYTES ABSOLUTE: 1 K/UL (ref 0–1.3)
MONOCYTES RELATIVE PERCENT: 6.3 %
NEUTROPHILS ABSOLUTE: 12.8 K/UL (ref 1.7–7.7)
NEUTROPHILS RELATIVE PERCENT: 81.5 %
NITRITE, URINE: NEGATIVE
OPIATE SCREEN URINE: NORMAL
OXYCODONE URINE: NORMAL
PDW BLD-RTO: 13.7 % (ref 12.4–15.4)
PERFORMED ON: ABNORMAL
PH UA: 5.5 (ref 5–8)
PH UA: 6
PHENCYCLIDINE SCREEN URINE: NORMAL
PLATELET # BLD: 341 K/UL (ref 135–450)
PMV BLD AUTO: 7.6 FL (ref 5–10.5)
POTASSIUM REFLEX MAGNESIUM: 4.3 MMOL/L (ref 3.5–5.1)
POTASSIUM SERPL-SCNC: 4 MMOL/L (ref 3.5–5.1)
PROPOXYPHENE SCREEN: NORMAL
PROTEIN UA: ABNORMAL MG/DL
RBC # BLD: 4.92 M/UL (ref 4–5.2)
RBC UA: ABNORMAL /HPF (ref 0–4)
SODIUM BLD-SCNC: 138 MMOL/L (ref 136–145)
SODIUM BLD-SCNC: 142 MMOL/L (ref 136–145)
SPECIFIC GRAVITY UA: 1.02 (ref 1–1.03)
T3 TOTAL: 1.02 NG/ML (ref 0.8–2)
T4 FREE: 1.1 NG/DL (ref 0.9–1.8)
THYROID PEROXIDASE (TPO) ABS: <5 IU/ML
TOTAL PROTEIN: 6.6 G/DL (ref 6.4–8.2)
TSH SERPL DL<=0.05 MIU/L-ACNC: 1.68 UIU/ML (ref 0.27–4.2)
URINE REFLEX TO CULTURE: ABNORMAL
URINE TYPE: ABNORMAL
UROBILINOGEN, URINE: 0.2 E.U./DL
WBC # BLD: 15.7 K/UL (ref 4–11)
WBC UA: 1 /HPF (ref 0–5)

## 2020-09-11 PROCEDURE — 2580000003 HC RX 258: Performed by: NURSE PRACTITIONER

## 2020-09-11 PROCEDURE — 80053 COMPREHEN METABOLIC PANEL: CPT

## 2020-09-11 PROCEDURE — 82627 DEHYDROEPIANDROSTERONE: CPT

## 2020-09-11 PROCEDURE — 71045 X-RAY EXAM CHEST 1 VIEW: CPT

## 2020-09-11 PROCEDURE — 84439 ASSAY OF FREE THYROXINE: CPT

## 2020-09-11 PROCEDURE — 36415 COLL VENOUS BLD VENIPUNCTURE: CPT

## 2020-09-11 PROCEDURE — G0480 DRUG TEST DEF 1-7 CLASSES: HCPCS

## 2020-09-11 PROCEDURE — 70450 CT HEAD/BRAIN W/O DYE: CPT

## 2020-09-11 PROCEDURE — 84443 ASSAY THYROID STIM HORMONE: CPT

## 2020-09-11 PROCEDURE — 93010 ELECTROCARDIOGRAM REPORT: CPT | Performed by: INTERNAL MEDICINE

## 2020-09-11 PROCEDURE — 93005 ELECTROCARDIOGRAM TRACING: CPT | Performed by: EMERGENCY MEDICINE

## 2020-09-11 PROCEDURE — 82530 CORTISOL FREE: CPT

## 2020-09-11 PROCEDURE — 86376 MICROSOMAL ANTIBODY EACH: CPT

## 2020-09-11 PROCEDURE — 86800 THYROGLOBULIN ANTIBODY: CPT

## 2020-09-11 PROCEDURE — 82024 ASSAY OF ACTH: CPT

## 2020-09-11 PROCEDURE — 84480 ASSAY TRIIODOTHYRONINE (T3): CPT

## 2020-09-11 PROCEDURE — 84703 CHORIONIC GONADOTROPIN ASSAY: CPT

## 2020-09-11 PROCEDURE — 85025 COMPLETE CBC W/AUTO DIFF WBC: CPT

## 2020-09-11 PROCEDURE — 80307 DRUG TEST PRSMV CHEM ANLYZR: CPT

## 2020-09-11 PROCEDURE — 81001 URINALYSIS AUTO W/SCOPE: CPT

## 2020-09-11 PROCEDURE — 82533 TOTAL CORTISOL: CPT

## 2020-09-11 PROCEDURE — 99285 EMERGENCY DEPT VISIT HI MDM: CPT

## 2020-09-11 RX ORDER — 0.9 % SODIUM CHLORIDE 0.9 %
1000 INTRAVENOUS SOLUTION INTRAVENOUS ONCE
Status: COMPLETED | OUTPATIENT
Start: 2020-09-11 | End: 2020-09-11

## 2020-09-11 RX ADMIN — SODIUM CHLORIDE 1000 ML: 9 INJECTION, SOLUTION INTRAVENOUS at 12:49

## 2020-09-11 ASSESSMENT — ENCOUNTER SYMPTOMS
CONSTIPATION: 0
ABDOMINAL PAIN: 0
SHORTNESS OF BREATH: 0
NAUSEA: 0
DIARRHEA: 0
BLOOD IN STOOL: 0
RHINORRHEA: 0
VOMITING: 0
SORE THROAT: 0

## 2020-09-11 NOTE — ED NOTES
Pt remains post ictal, confused about events. VSS Seizure precautions in place.       Miesha Greene RN  09/11/20 3001

## 2020-09-11 NOTE — ED NOTES
Pt assisted to void using bedside commode. Urine sample obtained and sent to lab. Pt returned to bed, with heart monitor in place, call light in reach, family member bedside.       Marin Ayala RN  09/11/20 1400
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
## 2020-09-13 LAB
CORTISOL (UR), FREE: 190.8 UG/D
CORTISOL URINE, FREE (/G CRT): 128.23 UG/G CRT
CORTISOL,F,UG/L,U: 159 UG/L
CREATININE 24 HOUR URINE: 1488 MG/D (ref 700–1600)
CREATININE URINE: 124 MG/DL
HOURS COLLECTED: 24
INTERPRETATION: ABNORMAL
URINE TOTAL VOLUME: 1200

## 2020-09-14 ENCOUNTER — TELEPHONE (OUTPATIENT)
Dept: FAMILY MEDICINE CLINIC | Age: 45
End: 2020-09-14

## 2020-09-14 ENCOUNTER — TELEPHONE (OUTPATIENT)
Dept: NEUROLOGY | Age: 45
End: 2020-09-14

## 2020-09-14 NOTE — TELEPHONE ENCOUNTER
Order for MRI was placed in [facility]. Patient can call [phone] to schedule the MRI.
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
## 2020-09-14 NOTE — TELEPHONE ENCOUNTER
This is not a correct number for [employer]. Its an unassigned number for [facility]. If patient calls back, we need a referral from PCP to schedule.
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
## 2020-09-15 ENCOUNTER — TELEPHONE (OUTPATIENT)
Dept: FAMILY MEDICINE CLINIC | Age: 45
End: 2020-09-15

## 2020-09-15 LAB
CORTISOL SALIVARY: 0.22 UG/DL
CORTISOL SALIVARY: 0.49 UG/DL

## 2020-09-15 NOTE — TELEPHONE ENCOUNTER
----- Message from [facility] sent at 9/15/2020 10:38 AM EDT -----  Subject: Message to Provider    QUESTIONS  Information for Provider? patient need her mri order sent to [facility]   neuro science fax number [phone] that would be the office scheduling   department.   ---------------------------------------------------------------------------  --------------  [address]  What is the best way for the office to contact you? OK to leave message on   voicemail  Preferred Call Back Phone Number? [phone]  ---------------------------------------------------------------------------  --------------  SCRIPT ANSWERS  Relationship to Patient?  Self

## 2020-09-16 LAB
ADRENOCORTICOTROPIC HORMONE: 44 PG/ML (ref 6–58)
DHEAS (DHEA SULFATE): 88.9 UG/DL (ref 32–240)

## 2020-09-17 ENCOUNTER — VIRTUAL VISIT (OUTPATIENT)
Dept: ENDOCRINOLOGY | Age: 45
End: 2020-09-17
Payer: COMMERCIAL

## 2020-09-17 PROCEDURE — 99214 OFFICE O/P EST MOD 30 MIN: CPT | Performed by: INTERNAL MEDICINE

## 2020-09-17 RX ORDER — DEXAMETHASONE 1 MG
TABLET ORAL
Qty: 120 TABLET | Refills: 11 | Status: SHIPPED | OUTPATIENT
Start: 2020-09-17 | End: 2020-09-17 | Stop reason: SDUPTHER

## 2020-09-17 RX ORDER — DEXAMETHASONE 1 MG
TABLET ORAL
Qty: 1 TABLET | Refills: 2 | Status: SHIPPED | OUTPATIENT
Start: 2020-09-17 | End: 2021-05-10

## 2020-09-17 NOTE — Clinical Note
Please send  prescription for 1 mg dexamethasone tablet to her pharmacy I am not sure if that prescription went to her pharmacy as there was a long instructions
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
## 2020-09-17 NOTE — PATIENT INSTRUCTIONS
Take 1 mg, ie, two tablets of 0.5 mg) is taken orally between 11 PM and midnight, and a single blood sample is drawn at 8 AM the next morning for assay of serum cortisol and, if available, serum dexamethasone

## 2020-09-21 ENCOUNTER — TELEPHONE (OUTPATIENT)
Dept: ENDOCRINOLOGY | Age: 45
End: 2020-09-21

## 2020-09-22 NOTE — TELEPHONE ENCOUNTER
Regarding MRI  For Patient    Bk Ingram called from [facility] care  off # [phone]    She needs clinical information faxed to her , any thing supporting  Patient's  MRI    Fax # [phone]  Please advise

## 2020-09-23 ENCOUNTER — TELEPHONE (OUTPATIENT)
Dept: ENT CLINIC | Age: 45
End: 2020-09-23

## 2020-09-23 NOTE — TELEPHONE ENCOUNTER
Horace Baxter called from [facility] office  # [phone] requesting office notes for patient     Regarding patient having a MRI ,  Fax # [phone]

## 2020-09-25 ENCOUNTER — HOSPITAL ENCOUNTER (OUTPATIENT)
Age: 45
Discharge: HOME | End: 2020-09-25
Payer: COMMERCIAL

## 2020-09-25 LAB — CORTISOL - AM: 9.9 UG/DL (ref 4.3–22.4)

## 2020-09-25 PROCEDURE — 36415 COLL VENOUS BLD VENIPUNCTURE: CPT

## 2020-09-25 PROCEDURE — 80299 QUANTITATIVE ASSAY DRUG: CPT

## 2020-09-25 PROCEDURE — 82533 TOTAL CORTISOL: CPT

## 2020-09-28 ENCOUNTER — TELEPHONE (OUTPATIENT)
Dept: ENDOCRINOLOGY | Age: 45
End: 2020-09-28

## 2020-09-29 NOTE — TELEPHONE ENCOUNTER
That is a part of dexamethasone suppression testing that we check the level of dexamethasone and cortisol the next day to make sure that patient absorbed the dexamethasone from night before and the cortisol levels are accurate in the presence of dexamethasone.   I still do not have those results back though

## 2020-09-29 NOTE — TELEPHONE ENCOUNTER
Spoke with pt and informed her about her cortisol level. Also informed her that we were still awaiting her Dexamethasone results. Pt stated that when she recently went to get her labs drawn for the cortisol level that she was informed to take the Dexamethasone the night before getting labs drawn but she also was under the impression that taking the Dexamethasone was simply to suppress her results. She did not know that she was having levels checked for that as well.  Please confirm

## 2020-10-02 LAB — MISCELLANEOUS LAB TEST ORDER: NORMAL

## 2020-10-06 ENCOUNTER — TELEPHONE (OUTPATIENT)
Dept: ENDOCRINOLOGY | Age: 45
End: 2020-10-06

## 2020-10-06 NOTE — TELEPHONE ENCOUNTER
It shows that you had adequate dex level for the test to be considered ---done properly and the results of cortisol was abnormal high as we expected due to cushing disease most likely

## 2020-10-14 ENCOUNTER — HOSPITAL ENCOUNTER (OUTPATIENT)
Age: 45
Discharge: HOME | End: 2020-10-14
Payer: COMMERCIAL

## 2020-10-14 LAB
CORTISOL TOTAL: 8 UG/DL
ESTRADIOL LEVEL: 16 PG/ML
FOLLICLE STIMULATING HORMONE: 8.7 MIU/ML
PROLACTIN: 9.2 NG/ML

## 2020-10-14 PROCEDURE — 80299 QUANTITATIVE ASSAY DRUG: CPT

## 2020-10-14 PROCEDURE — 36415 COLL VENOUS BLD VENIPUNCTURE: CPT

## 2020-10-14 PROCEDURE — 83001 ASSAY OF GONADOTROPIN (FSH): CPT

## 2020-10-14 PROCEDURE — 84305 ASSAY OF SOMATOMEDIN: CPT

## 2020-10-14 PROCEDURE — 82670 ASSAY OF TOTAL ESTRADIOL: CPT

## 2020-10-14 PROCEDURE — 84146 ASSAY OF PROLACTIN: CPT

## 2020-10-14 PROCEDURE — 82533 TOTAL CORTISOL: CPT

## 2020-10-16 LAB
IGF-1 (INSULIN-LIKE GROWTH I): 272 NG/ML (ref 71–258)
INSULIN-LIKE GROWTH FACTOR-1 Z-SCORE: 2

## 2020-10-22 ENCOUNTER — HOSPITAL ENCOUNTER (OUTPATIENT)
Age: 45
Discharge: HOME | End: 2020-10-22
Payer: COMMERCIAL

## 2020-10-22 LAB
24HR URINE VOLUME (ML): 950 ML
CORTISOL - AM: 18.7 UG/DL (ref 4.3–22.4)
CREATININE 24 HOUR URINE: 1.2 G/24HR (ref 0.6–1.5)
MISCELLANEOUS LAB TEST ORDER: NORMAL

## 2020-10-22 PROCEDURE — 82024 ASSAY OF ACTH: CPT

## 2020-10-22 PROCEDURE — 82570 ASSAY OF URINE CREATININE: CPT

## 2020-10-22 PROCEDURE — 82533 TOTAL CORTISOL: CPT

## 2020-10-22 PROCEDURE — 36415 COLL VENOUS BLD VENIPUNCTURE: CPT

## 2020-10-22 PROCEDURE — 82530 CORTISOL FREE: CPT

## 2020-10-26 LAB — ADRENOCORTICOTROPIC HORMONE: 49 PG/ML (ref 6–58)

## 2020-10-29 LAB
CORTISOL (UR), FREE: 95 UG/D
CORTISOL URINE, FREE (/G CRT): 81.97 UG/G CRT
CORTISOL,F,UG/L,U: 100 UG/L
CREATININE 24 HOUR URINE: 1159 MG/D (ref 700–1600)
CREATININE URINE: 122 MG/DL
HOURS COLLECTED: 24
INTERPRETATION: ABNORMAL
URINE TOTAL VOLUME: 950

## 2020-10-30 ENCOUNTER — TELEPHONE (OUTPATIENT)
Dept: ENDOCRINOLOGY | Age: 45
End: 2020-10-30

## 2020-10-30 NOTE — TELEPHONE ENCOUNTER
Lost My Name message sent from patient:    Dr. Angie Baird,   I just wanted to update you. .. Dr. Vahid Madrigal at [facility] has run repeat tests including 24 hour urine, ACTH, blood cortisol and a few others. The test results confirm the diagnosis of Cushing's Disease. He says the test results are pointing toward a pituitary source even though my recent 3T MRI was clear. He will be scheduling the Inferior Petrosal Sinus Sampling test next at [facility]. I currently have a follow-up visit scheduled with you on November 19th. At this time I would like to cancel that appointment. After testing and/or treatment is done there, I will follow up with you if recommended. Thank you so much! Sheyla Broussard     I have cancelled the patient's appt.

## 2020-11-09 ENCOUNTER — HOSPITAL ENCOUNTER (EMERGENCY)
Age: 45
Discharge: HOME | End: 2020-11-09
Attending: EMERGENCY MEDICINE
Payer: COMMERCIAL

## 2020-11-09 ENCOUNTER — APPOINTMENT (OUTPATIENT)
Dept: CT IMAGING | Age: 45
End: 2020-11-09
Payer: COMMERCIAL

## 2020-11-09 VITALS
RESPIRATION RATE: 18 BRPM | HEART RATE: 92 BPM | HEIGHT: 62 IN | SYSTOLIC BLOOD PRESSURE: 140 MMHG | BODY MASS INDEX: 39.93 KG/M2 | TEMPERATURE: 98.7 F | WEIGHT: 217 LBS | OXYGEN SATURATION: 98 % | DIASTOLIC BLOOD PRESSURE: 65 MMHG

## 2020-11-09 LAB
ALBUMIN SERPL-MCNC: 5 G/DL (ref 3.4–5)
ALP BLD-CCNC: 76 U/L (ref 40–129)
ALT SERPL-CCNC: 11 U/L (ref 10–40)
AMMONIA: 26 UMOL/L (ref 11–51)
ANION GAP SERPL CALCULATED.3IONS-SCNC: 13 MMOL/L (ref 3–16)
AST SERPL-CCNC: 18 U/L (ref 15–37)
BASE EXCESS VENOUS: 0.7 MMOL/L (ref -3–3)
BASOPHILS ABSOLUTE: 0.1 K/UL (ref 0–0.2)
BASOPHILS RELATIVE PERCENT: 0.7 %
BILIRUB SERPL-MCNC: 0.3 MG/DL (ref 0–1)
BILIRUBIN DIRECT: <0.2 MG/DL (ref 0–0.3)
BILIRUBIN, INDIRECT: ABNORMAL MG/DL (ref 0–1)
BUN BLDV-MCNC: 16 MG/DL (ref 7–20)
CALCIUM SERPL-MCNC: 10.1 MG/DL (ref 8.3–10.6)
CARBOXYHEMOGLOBIN: 2.1 % (ref 0–1.5)
CHLORIDE BLD-SCNC: 100 MMOL/L (ref 99–110)
CO2: 25 MMOL/L (ref 21–32)
CREAT SERPL-MCNC: 0.6 MG/DL (ref 0.6–1.1)
EKG ATRIAL RATE: 107 BPM
EKG DIAGNOSIS: NORMAL
EKG P AXIS: 52 DEGREES
EKG P-R INTERVAL: 146 MS
EKG Q-T INTERVAL: 326 MS
EKG QRS DURATION: 86 MS
EKG QTC CALCULATION (BAZETT): 435 MS
EKG R AXIS: 11 DEGREES
EKG T AXIS: 29 DEGREES
EKG VENTRICULAR RATE: 107 BPM
EOSINOPHILS ABSOLUTE: 0.1 K/UL (ref 0–0.6)
EOSINOPHILS RELATIVE PERCENT: 0.3 %
GFR AFRICAN AMERICAN: >60
GFR NON-AFRICAN AMERICAN: >60
GLUCOSE BLD-MCNC: 119 MG/DL (ref 70–99)
HCG QUALITATIVE: NEGATIVE
HCO3 VENOUS: 26.3 MMOL/L (ref 23–29)
HCT VFR BLD CALC: 51 % (ref 36–48)
HEMOGLOBIN, VEN, REDUCED: 13 %
HEMOGLOBIN: 17 G/DL (ref 12–16)
LACTIC ACID: 2.3 MMOL/L (ref 0.4–2)
LYMPHOCYTES ABSOLUTE: 1.2 K/UL (ref 1–5.1)
LYMPHOCYTES RELATIVE PERCENT: 7.3 %
MCH RBC QN AUTO: 32.1 PG (ref 26–34)
MCHC RBC AUTO-ENTMCNC: 33.3 G/DL (ref 31–36)
MCV RBC AUTO: 96.3 FL (ref 80–100)
METHEMOGLOBIN VENOUS: 0.2 %
MONOCYTES ABSOLUTE: 1 K/UL (ref 0–1.3)
MONOCYTES RELATIVE PERCENT: 6.3 %
NEUTROPHILS ABSOLUTE: 14.1 K/UL (ref 1.7–7.7)
NEUTROPHILS RELATIVE PERCENT: 85.4 %
O2 CONTENT, VEN: 22 VOL %
O2 SAT, VEN: 87 %
O2 THERAPY: ABNORMAL
PCO2, VEN: 44.2 MMHG (ref 40–50)
PDW BLD-RTO: 13.5 % (ref 12.4–15.4)
PH VENOUS: 7.38 (ref 7.35–7.45)
PLATELET # BLD: 347 K/UL (ref 135–450)
PMV BLD AUTO: 7.1 FL (ref 5–10.5)
PO2, VEN: 53.1 MMHG (ref 25–40)
POTASSIUM REFLEX MAGNESIUM: 4 MMOL/L (ref 3.5–5.1)
RBC # BLD: 5.29 M/UL (ref 4–5.2)
SODIUM BLD-SCNC: 138 MMOL/L (ref 136–145)
TCO2 CALC VENOUS: 62 MMOL/L
TOTAL PROTEIN: 8.3 G/DL (ref 6.4–8.2)
WBC # BLD: 16.5 K/UL (ref 4–11)

## 2020-11-09 PROCEDURE — 83605 ASSAY OF LACTIC ACID: CPT

## 2020-11-09 PROCEDURE — 80076 HEPATIC FUNCTION PANEL: CPT

## 2020-11-09 PROCEDURE — 82803 BLOOD GASES ANY COMBINATION: CPT

## 2020-11-09 PROCEDURE — 99285 EMERGENCY DEPT VISIT HI MDM: CPT

## 2020-11-09 PROCEDURE — 93005 ELECTROCARDIOGRAM TRACING: CPT | Performed by: EMERGENCY MEDICINE

## 2020-11-09 PROCEDURE — 70450 CT HEAD/BRAIN W/O DYE: CPT

## 2020-11-09 PROCEDURE — 6360000002 HC RX W HCPCS: Performed by: PHYSICIAN ASSISTANT

## 2020-11-09 PROCEDURE — 82140 ASSAY OF AMMONIA: CPT

## 2020-11-09 PROCEDURE — 80048 BASIC METABOLIC PNL TOTAL CA: CPT

## 2020-11-09 PROCEDURE — 85025 COMPLETE CBC W/AUTO DIFF WBC: CPT

## 2020-11-09 PROCEDURE — 96365 THER/PROPH/DIAG IV INF INIT: CPT

## 2020-11-09 PROCEDURE — 93010 ELECTROCARDIOGRAM REPORT: CPT | Performed by: INTERNAL MEDICINE

## 2020-11-09 PROCEDURE — 2580000003 HC RX 258: Performed by: PHYSICIAN ASSISTANT

## 2020-11-09 PROCEDURE — 84703 CHORIONIC GONADOTROPIN ASSAY: CPT

## 2020-11-09 RX ORDER — OXCARBAZEPINE 300 MG/1
300 TABLET, FILM COATED ORAL DAILY
Qty: 60 TABLET | Refills: 0 | Status: SHIPPED | OUTPATIENT
Start: 2020-11-09 | End: 2021-05-10

## 2020-11-09 RX ORDER — LEVETIRACETAM 10 MG/ML
1000 INJECTION INTRAVASCULAR ONCE
Status: COMPLETED | OUTPATIENT
Start: 2020-11-09 | End: 2020-11-09

## 2020-11-09 RX ORDER — 0.9 % SODIUM CHLORIDE 0.9 %
1000 INTRAVENOUS SOLUTION INTRAVENOUS ONCE
Status: COMPLETED | OUTPATIENT
Start: 2020-11-09 | End: 2020-11-09

## 2020-11-09 RX ADMIN — LEVETIRACETAM 1000 MG: 10 INJECTION INTRAVENOUS at 12:59

## 2020-11-09 RX ADMIN — SODIUM CHLORIDE 1000 ML: 9 INJECTION, SOLUTION INTRAVENOUS at 12:59

## 2020-11-09 ASSESSMENT — ENCOUNTER SYMPTOMS
COUGH: 0
ABDOMINAL PAIN: 0
VOMITING: 0
RHINORRHEA: 0
SHORTNESS OF BREATH: 0
DIARRHEA: 0
NAUSEA: 0

## 2020-11-09 NOTE — ED PROVIDER NOTES
[facility]        Pt Name: Hui Mckeon  MRN: [id number]  [place] 1975  Date of evaluation: 11/9/2020  Provider: Allie Hernandez PA-C  PCP: Aniceto Solis MD     I have seen and evaluated this patient with my supervising physician Marti Jeffery MD.    [address] [place] [address]       Chief Complaint   Patient presents with    Seizures     pt brought in by [facility] EMD from home c/o pt had seizure that lasted approx 2 min. Pts had recent sizure in Sept.  pt taked no meds for seizures       HISTORY OF PRESENT ILLNESS   (Location, Timing/Onset, Context/Setting, Quality, Duration, Modifying Factors, Severity, Associated Signs and Symptoms)  Note limiting factors. Hui Mckeon is a 39 y.o. female presents to the emergency department today for evaluation for a possible seizure. The patient states that she had a seizure in September of this year. She states that she is not on any medications. She is followed by / Simba Guerra, neurology. The patient states that she was not placed on any medication as it was her first time seizure. The patient states that she remembers sitting at the computer today and she states that she does not remember what happened after that.  is at bedside and he is providing the history. He states that he walked into the craft room, and the patient was slumped over in the chair, he states that she would not respond to him until he said that he was going to call EMS and then she opened her eyes to look at him. He states that she then fell to the floor and was shaking all over for less than 2 minutes. When EMS arrived she was slow to respond, and when she arrived to the ED she is alert and oriented. The patient states that she feels tired, and she feels like she may have had a seizure. She did not bite her tongue or urinate on herself. She does not have a headache. She has no visual changes. She has no numbness, tingling or weakness. She is no chest pain or shortness of breath. She denies any recent illnesses including fever, cough, vomiting or diarrhea. Patient otherwise has no complaints at this time. Nursing Notes were all reviewed and agreed with or any disagreements were addressed in the HPI. REVIEW OF SYSTEMS    (2-9 systems for level 4, 10 or more for level 5)     Review of Systems   Constitutional: Negative for activity change, appetite change, chills and fever. HENT: Negative for congestion and rhinorrhea. Eyes: Negative for visual disturbance. Respiratory: Negative for cough and shortness of breath. Cardiovascular: Negative for chest pain. Gastrointestinal: Negative for abdominal pain, diarrhea, nausea and vomiting. Genitourinary: Negative for difficulty urinating, dysuria and hematuria. Neurological: Positive for seizures. Negative for weakness and numbness. Positives and Pertinent negatives as per HPI. Except as noted above in the ROS, all other systems were reviewed and negative.        PAST MEDICAL HISTORY     Past Medical History:   Diagnosis Date    Depression     Female pattern hair loss     Insulin resistance     Obesity     PCOS (polycystic ovarian syndrome)     Seizures (HCC)          SURGICAL HISTORY     Past Surgical History:   Procedure Laterality Date    INCONTINENCE SURGERY      OVARIAN CYST REMOVAL Right     PLANTAR FASCIA SURGERY Bilateral          CURRENTMEDICATIONS       Discharge Medication List as of 11/9/2020  1:59 PM      CONTINUE these medications which have NOT CHANGED    Details   spironolactone (ALDACTONE) 50 MG tablet TAKE ONE TABLET BY MOUTH TWICE A DAY, Disp-180 tablet,R-0Normal      VITAMIN D PO Take 2,000 Units by mouthHistorical Med      Ascorbic Acid (VITAMIN C PO) Take by mouthHistorical Med      desvenlafaxine succinate (PRISTIQ) 100 MG TB24 extended release tablet TAKE 1 TABLET BY MOUTH DAILY, Disp-90 tablet, Nose normal.      Mouth/Throat:      Mouth: Mucous membranes are moist.      Pharynx: Oropharynx is clear. No posterior oropharyngeal erythema. Eyes:      General:         Right eye: No discharge. Left eye: No discharge. Extraocular Movements: Extraocular movements intact. Conjunctiva/sclera: Conjunctivae normal.      Pupils: Pupils are equal, round, and reactive to light. Neck:      Musculoskeletal: Normal range of motion and neck supple. Trachea: No tracheal deviation. Cardiovascular:      Rate and Rhythm: Normal rate and regular rhythm. Heart sounds: No murmur. Pulmonary:      Effort: Pulmonary effort is normal. No respiratory distress. Breath sounds: Normal breath sounds. No wheezing or rales. Abdominal:      General: Bowel sounds are normal. There is no distension. Palpations: Abdomen is soft. Tenderness: There is no abdominal tenderness. There is no guarding. Musculoskeletal: Normal range of motion. Skin:     General: Skin is warm and dry. Neurological:      Mental Status: She is alert and oriented to person, place, and time. GCS: GCS eye subscore is 4. GCS verbal subscore is 5. GCS motor subscore is 6. Cranial Nerves: Cranial nerves are intact. Sensory: Sensation is intact. Motor: Motor function is intact. Coordination: Coordination is intact.    Psychiatric:         Behavior: Behavior normal.         DIAGNOSTIC RESULTS   LABS:    Labs Reviewed   CBC WITH AUTO DIFFERENTIAL - Abnormal; Notable for the following components:       Result Value    WBC 16.5 (*)     RBC 5.29 (*)     Hemoglobin 17.0 (*)     Hematocrit 51.0 (*)     Neutrophils Absolute 14.1 (*)     All other components within normal limits    Narrative:     Performed at:  [facility] [address], [place], [facility] [address]   Phone [phone]   BASIC METABOLIC PANEL W/ REFLEX TO MG FOR LOW K - Abnormal; Notable for the following components:    Glucose 119 (*)     All other components within normal limits    Narrative:     Performed at:  [facility] [address]   Phone [phone]   LACTIC ACID, PLASMA - Abnormal; Notable for the following components:    Lactic Acid 2.3 (*)     All other components within normal limits    Narrative:     Performed at:  [facility] [address] [place], [place]   Phone [phone]   BLOOD GAS, VENOUS - Abnormal; Notable for the following components:    pO2, Ignacio 53.1 (*)     Carboxyhemoglobin 2.1 (*)     All other components within normal limits    Narrative:     Performed at:  [facility] [address]   Phone [phone]   HEPATIC FUNCTION PANEL - Abnormal; Notable for the following components: Total Protein 8.3 (*)     All other components within normal limits    Narrative:     Performed at:  [facility] [address]   Phone [phone]   HCG, SERUM, QUALITATIVE    Narrative:     Performed at:  [facility] [address]   Phone [phone]   AMMONIA    Narrative:     Performed at:  [facility] [address]   Phone [phone]       All other labs were within normal range or not returned as of this dictation. EKG: All EKG's are interpreted by the Emergency Department Physician in the absence of a cardiologist.  Please see their note for interpretation of EKG.       RADIOLOGY:   Non-plain film images such as CT, Ultrasound and MRI are read by the radiologist. Plain radiographic images are visualized and preliminarily interpreted by the ED Provider with the below findings:        Interpretation per the Radiologist below, if available at the time of this note:    CT HEAD WO CONTRAST   Preliminary Result   No evidence of acute intracranial abnormality. Ct Head Wo Contrast    Result Date: 11/9/2020  EXAMINATION: CT OF THE HEAD WITHOUT CONTRAST  11/9/2020 12:08 pm TECHNIQUE: CT of the head was performed without the administration of intravenous contrast. Dose modulation, iterative reconstruction, and/or weight based adjustment of the mA/kV was utilized to reduce the radiation dose to as low as reasonably achievable. COMPARISON: 09/11/2020. HISTORY: ORDERING SYSTEM PROVIDED HISTORY: seizure TECHNOLOGIST PROVIDED HISTORY: Reason for exam:->seizure Has a \"code stroke\" or \"stroke alert\" been called? ->No Is the patient pregnant?->No Reason for Exam: seizures Acuity: Acute Type of Exam: Initial FINDINGS: BRAIN/VENTRICLES: Minimal motion/streak artifact is present on the examination. The ventricular system is normal in appearance. No evidence of mass effect or midline shift. There is mild prominence of sulci overlying convexities of cerebral hemispheres which is out of proportion as that expected for patient in this age group. No new area of abnormal attenuation of brain parenchyma is identified. No abnormal extra-axial fluid collections are identified. ORBITS: The visualized portion of the orbits demonstrate no acute abnormality. SINUSES: The visualized paranasal sinuses and mastoid air cells demonstrate no acute abnormality. There is opacification of few right mastoid air cells. SOFT TISSUES/SKULL:  No acute abnormality of the visualized skull or soft tissues. No evidence of acute intracranial abnormality.            PROCEDURES   Unless otherwise noted below, none     Procedures    CRITICAL CARE TIME   N/A    CONSULTS:  IP CONSULT TO NEUROLOGY      EMERGENCY DEPARTMENT COURSE and DIFFERENTIAL DIAGNOSIS/MDM:   Vitals:    Vitals:    11/09/20 1300 11/09/20 1302 11/09/20 1315 11/09/20 1330   BP: 138/66 138/63  (!) 140/65   Pulse:  103  92   Resp:  18  18   Temp:    98.7 °F (37.1 °C)   TempSrc:    Oral   SpO2: 99% 99% 99% 98%   Weight:       Height:           Patient was given the following medications:  Medications   levetiracetam (KEPPRA) 1000 mg/100 mL IVPB (0 mg Intravenous Stopped 11/9/20 1319)   0.9 % sodium chloride bolus (0 mLs Intravenous Stopped 11/9/20 1416)           Briefly, this is a 51-year-old female who presents to the emergency department today for evaluation for a seizure. The patient states that she had a seizure in September of this year. She sees Dr. Gladys Ramos, neurology. The patient states that she was not placed on medication, as this was her first seizure. The patient states that she does not remember what happened today. The patient apparently was sitting at her [employer] table, she was \"slumped over\" per the , he states that he told her that she was going to call EMS, and she then opened her eyes. She then had a shaking episode lasting 1 2 minutes or less. The patient states that when she arrived she feels tired but otherwise has no complaints    Physical exam is unremarkable, there are no neurological deficits    CT of head is negative    EKG is documented by my attending, please see his note for further details. She has a leukocytosis of 16.5, and lactic acid of 2.3, this is likely due to her recent seizure. Remainder of lab work is unremarkable. Vital signs are stable. I did discuss the case with neurology, Dr. Gladys Ramos, the patient's neurologist, she would like us to start the patient on Trileptal.  This has been ordered. Patient initially was ordered Keppra however she did not want to take this because she was concerned it could cause \"rage\". Patient will be started on Trileptal for home. She is routine follow-up with her neurologist within 2 to 3 days for reevaluation. She is to return to the ED for new or worsening symptoms. Patient voiced understanding is agreeable with plan. Stable for discharge.   My suspicions at this time for acute intracranial hemorrhage, subarachnoid hemorrhage, status epilepticus, life-threatening arrhythmia or other emergent etiology      FINAL IMPRESSION      1.  Seizure-like activity [facility])          DISPOSITION/PLAN   DISPOSITION Decision To Discharge 11/09/2020 01:50:19 PM      PATIENT REFERREDTO:  Chadd Argueta MD  [address] [facility] [address] [facility]  [phone]    Schedule an appointment as soon as possible for a visit in 2 days      [facility] Emergency Department  [address] [place] [address]  [phone]    As needed, If symptoms worsen      DISCHARGE MEDICATIONS:  Discharge Medication List as of 11/9/2020  1:59 PM      START taking these medications    Details   OXcarbazepine (TRILEPTAL) 300 MG tablet Take 1 tablet by mouth daily Take 1 tablet by mouth for 4 days, then take 1 by mouth twice a day until followed up with neurology, Disp-60 tablet,R-0Print             DISCONTINUED MEDICATIONS:  Discharge Medication List as of 11/9/2020  1:59 PM                 (Please note that portions of this note were completed with a voice recognition program.  Efforts were made to edit the dictations but occasionally words are mis-transcribed.)    Annita Borjas PA-C (electronically signed)           Annita Borjas PA-C  11/09/20 1223

## 2020-11-09 NOTE — ED NOTES
Reviewed discharge instructions with patient. No questions at this time. No sign of distress. AOx3. Pt ambulated to ER exit with steady gait.  Wheelchair offered but patient declined       Edwnia Leon RN  11/09/20 6134

## 2020-11-13 NOTE — ED PROVIDER NOTES
As physician-in-triage, I performed a medical screening history and physical exam on [facility]. I also cared for and evaluated the patient in conjunction with the ED Advanced Practice Provider. All diagnostic, treatment, and disposition decisions were made by myself in conjunction with the advanced practice provider. For all further details of the patient's emergency department visit, please see the advanced practice provider's documentation. Patient presents to the ER for evaluation of active seizure breakthrough, she is not on any antiepileptic that, she had extensive evaluation for seizure, same witnessed event of syncope followed by general convulsive activity without loss of bladder function. No other head trauma. No MRI unremarkable EEG has been seen by allergy in the past.  She is reticent to initiate she is concerned about Keppra rage through a Facebook group , she is a GCS of 15 he has no other dysrhythmia no other focal deficits no intracranial hemorrhage or mass condition on oxcarbazine, and outpatient follow-up with neurology. No driving. She is a for outpatient management and is no active status epilepticus.       Impression :seizure breakthrough     Abram Samano MD  94/73/54 [id number]

## 2020-12-01 RX ORDER — SPIRONOLACTONE 50 MG/1
TABLET, FILM COATED ORAL
Qty: 180 TABLET | Refills: 0 | Status: SHIPPED | OUTPATIENT
Start: 2020-12-01 | End: 2021-04-28

## 2020-12-01 NOTE — TELEPHONE ENCOUNTER
Medication:   Requested Prescriptions     Pending Prescriptions Disp Refills    spironolactone (ALDACTONE) 50 MG tablet [Pharmacy Med Name: SPIRONOLACTONE 50 MG TABLET] 180 tablet 0     Sig: TAKE ONE TABLET BY MOUTH TWICE A DAY        Last Filled:  9/3/2020 #180 0rf    Patient Phone Number: [phone] (home)     Last appt: 2/14/2020   Next appt: Visit date not found    Last OARRS:   RX Monitoring 7/6/2017   Attestation The Prescription Monitoring Report for this patient was reviewed today. Periodic Controlled Substance Monitoring Possible medication side effects, risk of tolerance and/or dependence, and alternative treatments discussed; No signs of potential drug abuse or diversion identified.

## 2021-03-24 ENCOUNTER — HOSPITAL ENCOUNTER (OUTPATIENT)
Age: 46
Discharge: HOME | End: 2021-03-24
Payer: COMMERCIAL

## 2021-03-24 PROCEDURE — 82024 ASSAY OF ACTH: CPT

## 2021-03-26 LAB — ADRENOCORTICOTROPIC HORMONE: <5 PG/ML (ref 6–58)

## 2021-04-22 ENCOUNTER — HOSPITAL ENCOUNTER (OUTPATIENT)
Dept: WOMENS IMAGING | Age: 46
Discharge: HOME | End: 2021-04-22
Payer: COMMERCIAL

## 2021-04-22 DIAGNOSIS — Z12.31 BREAST CANCER SCREENING BY MAMMOGRAM: ICD-10-CM

## 2021-04-22 PROCEDURE — 77063 BREAST TOMOSYNTHESIS BI: CPT

## 2021-04-28 RX ORDER — SPIRONOLACTONE 50 MG/1
TABLET, FILM COATED ORAL
Qty: 60 TABLET | Refills: 0 | Status: SHIPPED | OUTPATIENT
Start: 2021-04-28 | End: 2021-06-21

## 2021-05-10 ENCOUNTER — OFFICE VISIT (OUTPATIENT)
Dept: FAMILY MEDICINE CLINIC | Age: 46
End: 2021-05-10
Payer: COMMERCIAL

## 2021-05-10 VITALS
SYSTOLIC BLOOD PRESSURE: 138 MMHG | DIASTOLIC BLOOD PRESSURE: 86 MMHG | OXYGEN SATURATION: 98 % | WEIGHT: 203.4 LBS | BODY MASS INDEX: 37.2 KG/M2 | HEART RATE: 81 BPM

## 2021-05-10 DIAGNOSIS — G40.909 SEIZURE DISORDER (HCC): ICD-10-CM

## 2021-05-10 DIAGNOSIS — Z86.018 HISTORY OF PITUITARY ADENOMA: ICD-10-CM

## 2021-05-10 DIAGNOSIS — F33.40 RECURRENT MAJOR DEPRESSIVE DISORDER, IN REMISSION (HCC): ICD-10-CM

## 2021-05-10 DIAGNOSIS — E28.2 PCOS (POLYCYSTIC OVARIAN SYNDROME): ICD-10-CM

## 2021-05-10 DIAGNOSIS — Z00.00 ANNUAL PHYSICAL EXAM: Primary | ICD-10-CM

## 2021-05-10 PROCEDURE — 99396 PREV VISIT EST AGE 40-64: CPT | Performed by: FAMILY MEDICINE

## 2021-05-10 RX ORDER — MULTIVITAMIN WITH IRON
250 TABLET ORAL DAILY
COMMUNITY
End: 2022-02-18

## 2021-05-10 RX ORDER — GLUCOSAMINE/CHONDR SU A SOD 750-600 MG
1 TABLET ORAL DAILY
COMMUNITY
End: 2022-02-18

## 2021-05-10 RX ORDER — CARBAMAZEPINE 200 MG/1
200 TABLET ORAL 2 TIMES DAILY
COMMUNITY
End: 2022-02-18

## 2021-05-10 RX ORDER — VITAMIN B COMPLEX
1 CAPSULE ORAL DAILY
COMMUNITY

## 2021-05-10 RX ORDER — HYDROCORTISONE 5 MG/1
TABLET ORAL
COMMUNITY
Start: 2021-04-29

## 2021-05-10 NOTE — PROGRESS NOTES
[address] Note    Date: 5/10/2021                                               Subjective/Objective:     Chief Complaint   Patient presents with    Annual Exam       HPI   Patient is here for annual exam.  Last Tdap 2017. Last mammogram a few weeks ago, within normal limits. Last Pap smear 2 months ago, within normal limits. Patient has history of seizure disorder. Currently takes Tegretol. Last seizure 11/9/20. Sees neurology. Pt had Cushing's syndrome, had excision of pituitary tumor on 12/30/20. Is now taking Cortisone supplements until pituitary \"wakes up\". Sees endocrinology. Patient has history of depression. Currently takes Pristiq. Reports that moods are well controlled. See psychiatry. Patient has history of PCOS. Take spironolactone. Sees gynecology for and reports that symptoms are stable. Patient Active Problem List    Diagnosis Date Noted    Seizure disorder ([facility] 05/10/2021    PCOS (polycystic ovarian syndrome) 07/06/2017    Gluten intolerance 07/06/2017    Recurrent major depressive disorder, in remission ([facility] 07/06/2017    Hair loss 07/06/2017    Gastroesophageal reflux disease without esophagitis 07/06/2017    Hyperlipidemia 07/06/2017    Obesity 07/06/2017       Past Medical History:   Diagnosis Date    Cushing syndrome ([facility]     Depression     Female pattern hair loss     H/O: pituitary tumor     Hypercorticism (HCC)     Insulin resistance     Obesity     PCOS (polycystic ovarian syndrome)     Seizures (HCC)     Spinal stenosis     L5       Current Outpatient Medications   Medication Sig Dispense Refill    carBAMazepine (TEGRETOL) 200 MG tablet Take 200 mg by mouth 2 times daily      hydrocortisone (CORTEF) 5 MG tablet 10 mg breakfast, 7.5 mg lunch and 5 mg dinner.       magnesium (MAGNESIUM-OXIDE) 250 MG TABS tablet Take 250 mg by mouth daily      calcium carb-cholecalciferol (CALCIUM 600+D3) 600-200 MG-UNIT TABS tablet Take 1 tablet by mouth daily      b complex vitamins capsule Take 1 capsule by mouth daily      spironolactone (ALDACTONE) 50 MG tablet TAKE ONE TABLET BY MOUTH TWICE A DAY (Patient taking differently: TAKE ONE TABLET BY MOUTH ONCE A DAY) 60 tablet 0    desvenlafaxine succinate (PRISTIQ) 100 MG TB24 extended release tablet TAKE 1 TABLET BY MOUTH DAILY 90 tablet 2    traZODone (DESYREL) 100 MG tablet Take 1 tablet by mouth nightly (Patient taking differently: Take 50 mg by mouth nightly ) 30 tablet 2    cetirizine (ZYRTEC) 10 MG tablet Take 10 mg by mouth       No current facility-administered medications for this visit. Allergies   Allergen Reactions    Lamotrigine Rash    Penicillins Hives and Rash    Sulfa Antibiotics Rash    Gluten Meal     Penciclovir Rash       Review of Systems   No CP, no SOB, no rash, no bruise, no HA, no vision change, no ankle swelling, no hearing problems, no LAD      Vitals:  /86   Pulse 81   Wt 203 lb 6.4 oz (92.3 kg)   LMP 05/04/2021 (Exact Date)   SpO2 98%   Breastfeeding No   BMI 37.20 kg/m²     Physical Exam   General:  Well-appearing, NAD, alert, non-toxic  HEENT:  Normocephalic, atraumatic. Pupils equal and round. TMs pearly with good landmarks. Moist mucous membranes. Normal dentition  NECK:  Supple, normal range of motion, no LAD, no meningeal signs, no JVD, nontender  CHEST/LUNGS: CTAB, no crackles, no wheeze, no rhonchi. Symmetric rise  CARDIOVASCULAR: RRR,  no murmur, no rub  ABDOMEN: Soft, non-tender, non-distended. No masses  EXTREMETIES: Normal movement of all extremities. No edema. No joint swelling. SKIN:  No rash, no cellulitis, no bruising, no petechiae/purpura/vesicles/pustules/abscess  PSYCH:  A+O x 3; normal affect  NEURO:  GCS 15, CN2-12 grossly intact, no focal motor/sensory deficits, no cerebellar deficits, normal gait, normal speech      Assessment/Plan     57-year-old female here for annual exam.  Check routine labs.   Vital

## 2021-05-10 NOTE — PATIENT INSTRUCTIONS
Patient Education        Polycystic Ovary Syndrome: Care Instructions  Your Care Instructions  Polycystic ovary syndrome, or PCOS, means a woman's hormones are out of balance. It can cause problems with your periods and make it hard to get pregnant. Doctors don't know for sure what causes PCOS, but it seems to run in families. It also seems to be linked to obesity and a risk for diabetes. If you have PCOS, your sisters and daughters have a higher chance of getting it too. You may have other symptoms. These include weight gain, acne, too much hair growth on the face or body, high blood pressure, and high blood sugar. Your ovaries may have cysts on them. These cysts are growths filled with fluid. Keep in mind that although you may not have regular periods, you can still get pregnant. Talk to your doctor about birth control if you do not want to get pregnant. Sometimes the hormone changes with PCOS can also make it hard for some women to get pregnant. If this is a concern, talk to your doctor about treatment for this problem. Women who have PCOS can go for months or longer with no period. Your doctor may recommend medicines that can help get your cycles back to normal.  Follow-up care is a key part of your treatment and safety. Be sure to make and go to all appointments, and call your doctor if you are having problems. It's also a good idea to know your test results and keep a list of the medicines you take. How can you care for yourself at home? · Take your medicines exactly as prescribed. Call your doctor if you think you are having a problem with your medicine. · Eat a healthy diet. Include fruits, vegetables, beans, and whole grains in your diet each day. · If you are overweight, losing weight can help with many of the symptoms of PCOS. Talk to your doctor about safe ways to lose weight. · Get at least 30 minutes of exercise on most days of the week. Walking is a good choice.  Or you can run, swim, cycle, or play tennis or team sports. · For hair growth you don't want, try bleaching, plucking, electrolysis, or laser therapy. · Acne can be treated with over-the-counter medicines. Look for ones that have benzoyl peroxide or salicylic acid in them. · If you are feeling sad or depressed, consider talking to a counselor or to other women who have PCOS. It may help. When should you call for help? Call your doctor now or seek immediate medical care if:    · You have severe vaginal bleeding.     · You have new or worse belly or pelvic pain. Watch closely for changes in your health, and be sure to contact your doctor if:    · You do not get better as expected.     · You have unusual vaginal bleeding. Where can you learn more? Go to https://Novopyxiseb.Boom.fm. org and sign in to your [employer] account. Enter K057 in the BumpTop box to learn more about \"Polycystic Ovary Syndrome: Care Instructions. \"     If you do not have an account, please click on the \"Sign Up Now\" link. Current as of: July 17, 2020               Content Version: 12.8  © 2297-1543 Healthwise, Incorporated. Care instructions adapted under license by [facility] ([facility]). If you have questions about a medical condition or this instruction, always ask your healthcare professional. [address] any warranty or liability for your use of this information.

## 2021-05-19 ENCOUNTER — HOSPITAL ENCOUNTER (OUTPATIENT)
Age: 46
Discharge: HOME | End: 2021-05-19
Payer: COMMERCIAL

## 2021-05-19 DIAGNOSIS — Z00.00 ANNUAL PHYSICAL EXAM: ICD-10-CM

## 2021-05-19 LAB
BASOPHILS ABSOLUTE: 0.1 K/UL (ref 0–0.2)
BASOPHILS RELATIVE PERCENT: 1 %
CHOLESTEROL, FASTING: 225 MG/DL (ref 0–199)
EOSINOPHILS ABSOLUTE: 0.2 K/UL (ref 0–0.6)
EOSINOPHILS RELATIVE PERCENT: 2.9 %
HCT VFR BLD CALC: 44.9 % (ref 36–48)
HDLC SERPL-MCNC: 49 MG/DL (ref 40–60)
HEMOGLOBIN: 14.9 G/DL (ref 12–16)
HEPATITIS C ANTIBODY INTERPRETATION: NORMAL
LDL CHOLESTEROL CALCULATED: 150 MG/DL
LYMPHOCYTES ABSOLUTE: 2.3 K/UL (ref 1–5.1)
LYMPHOCYTES RELATIVE PERCENT: 29.6 %
MCH RBC QN AUTO: 30.5 PG (ref 26–34)
MCHC RBC AUTO-ENTMCNC: 33.3 G/DL (ref 31–36)
MCV RBC AUTO: 91.7 FL (ref 80–100)
MONOCYTES ABSOLUTE: 0.7 K/UL (ref 0–1.3)
MONOCYTES RELATIVE PERCENT: 8.8 %
NEUTROPHILS ABSOLUTE: 4.5 K/UL (ref 1.7–7.7)
NEUTROPHILS RELATIVE PERCENT: 57.7 %
PDW BLD-RTO: 13.5 % (ref 12.4–15.4)
PHOSPHORUS: 4 MG/DL (ref 2.5–4.9)
PLATELET # BLD: 292 K/UL (ref 135–450)
PMV BLD AUTO: 8 FL (ref 5–10.5)
RBC # BLD: 4.89 M/UL (ref 4–5.2)
TRIGLYCERIDE, FASTING: 129 MG/DL (ref 0–150)
VLDLC SERPL CALC-MCNC: 26 MG/DL
WBC # BLD: 7.9 K/UL (ref 4–11)

## 2021-05-19 PROCEDURE — 82024 ASSAY OF ACTH: CPT

## 2021-05-19 PROCEDURE — 84100 ASSAY OF PHOSPHORUS: CPT

## 2021-05-19 PROCEDURE — 86701 HIV-1ANTIBODY: CPT

## 2021-05-19 PROCEDURE — 80061 LIPID PANEL: CPT

## 2021-05-19 PROCEDURE — 87390 HIV-1 AG IA: CPT

## 2021-05-19 PROCEDURE — 85025 COMPLETE CBC W/AUTO DIFF WBC: CPT

## 2021-05-19 PROCEDURE — 86803 HEPATITIS C AB TEST: CPT

## 2021-05-19 PROCEDURE — 86702 HIV-2 ANTIBODY: CPT

## 2021-05-19 PROCEDURE — 36415 COLL VENOUS BLD VENIPUNCTURE: CPT

## 2021-05-20 LAB
HIV AG/AB: NORMAL
HIV ANTIGEN: NORMAL
HIV-1 ANTIBODY: NORMAL
HIV-2 AB: NORMAL

## 2021-05-24 ENCOUNTER — TELEPHONE (OUTPATIENT)
Dept: FAMILY MEDICINE CLINIC | Age: 46
End: 2021-05-24

## 2021-05-24 LAB — ADRENOCORTICOTROPIC HORMONE: 9 PG/ML (ref 6–58)

## 2021-05-24 NOTE — TELEPHONE ENCOUNTER
Pt states her and her  Saunders Sever) left paperwork with Dr [employer] at their last appt to be filled out for her husbands Eyad Fowler. She is wanting to know if that paperwork has been faxed to the company yet.      Please call pt back and advise

## 2021-05-28 ENCOUNTER — TELEPHONE (OUTPATIENT)
Dept: FAMILY MEDICINE CLINIC | Age: 46
End: 2021-05-28

## 2021-05-28 NOTE — TELEPHONE ENCOUNTER
Pt needs copy of labs from May 19th.    (print lab flowsheet)  Advise pt when ready to .     (I printed copy of Health Screening and put in brown folder)

## 2021-05-30 ENCOUNTER — APPOINTMENT (OUTPATIENT)
Dept: GENERAL RADIOLOGY | Age: 46
End: 2021-05-30
Payer: COMMERCIAL

## 2021-05-30 ENCOUNTER — HOSPITAL ENCOUNTER (EMERGENCY)
Age: 46
Discharge: HOME | End: 2021-05-30
Attending: STUDENT IN AN ORGANIZED HEALTH CARE EDUCATION/TRAINING PROGRAM
Payer: COMMERCIAL

## 2021-05-30 VITALS
DIASTOLIC BLOOD PRESSURE: 95 MMHG | OXYGEN SATURATION: 95 % | RESPIRATION RATE: 16 BRPM | TEMPERATURE: 98.3 F | BODY MASS INDEX: 36.8 KG/M2 | HEIGHT: 62 IN | SYSTOLIC BLOOD PRESSURE: 166 MMHG | WEIGHT: 200 LBS | HEART RATE: 76 BPM

## 2021-05-30 DIAGNOSIS — R00.2 PALPITATIONS: Primary | ICD-10-CM

## 2021-05-30 LAB
A/G RATIO: 1.5 (ref 1.1–2.2)
ALBUMIN SERPL-MCNC: 4 G/DL (ref 3.4–5)
ALP BLD-CCNC: 83 U/L (ref 40–129)
ALT SERPL-CCNC: 43 U/L (ref 10–40)
ANION GAP SERPL CALCULATED.3IONS-SCNC: 11 MMOL/L (ref 3–16)
AST SERPL-CCNC: 46 U/L (ref 15–37)
BASOPHILS ABSOLUTE: 0.1 K/UL (ref 0–0.2)
BASOPHILS RELATIVE PERCENT: 1 %
BILIRUB SERPL-MCNC: <0.2 MG/DL (ref 0–1)
BUN BLDV-MCNC: 12 MG/DL (ref 7–20)
CALCIUM SERPL-MCNC: 9.5 MG/DL (ref 8.3–10.6)
CHLORIDE BLD-SCNC: 104 MMOL/L (ref 99–110)
CO2: 22 MMOL/L (ref 21–32)
CREAT SERPL-MCNC: 0.6 MG/DL (ref 0.6–1.1)
EOSINOPHILS ABSOLUTE: 0.2 K/UL (ref 0–0.6)
EOSINOPHILS RELATIVE PERCENT: 2 %
GFR AFRICAN AMERICAN: >60
GFR NON-AFRICAN AMERICAN: >60
GLOBULIN: 2.7 G/DL
GLUCOSE BLD-MCNC: 86 MG/DL (ref 70–99)
HCG QUALITATIVE: NEGATIVE
HCT VFR BLD CALC: 44.9 % (ref 36–48)
HEMOGLOBIN: 14.8 G/DL (ref 12–16)
LYMPHOCYTES ABSOLUTE: 1.9 K/UL (ref 1–5.1)
LYMPHOCYTES RELATIVE PERCENT: 21.2 %
MAGNESIUM: 2.2 MG/DL (ref 1.8–2.4)
MCH RBC QN AUTO: 30.7 PG (ref 26–34)
MCHC RBC AUTO-ENTMCNC: 33 G/DL (ref 31–36)
MCV RBC AUTO: 93 FL (ref 80–100)
MONOCYTES ABSOLUTE: 0.7 K/UL (ref 0–1.3)
MONOCYTES RELATIVE PERCENT: 7.9 %
NEUTROPHILS ABSOLUTE: 6.1 K/UL (ref 1.7–7.7)
NEUTROPHILS RELATIVE PERCENT: 67.9 %
PDW BLD-RTO: 13.9 % (ref 12.4–15.4)
PLATELET # BLD: 298 K/UL (ref 135–450)
PMV BLD AUTO: 7.2 FL (ref 5–10.5)
POTASSIUM SERPL-SCNC: 4.3 MMOL/L (ref 3.5–5.1)
PRO-BNP: 31 PG/ML (ref 0–124)
RBC # BLD: 4.83 M/UL (ref 4–5.2)
SODIUM BLD-SCNC: 137 MMOL/L (ref 136–145)
TOTAL PROTEIN: 6.7 G/DL (ref 6.4–8.2)
TROPONIN: <0.01 NG/ML
WBC # BLD: 9 K/UL (ref 4–11)

## 2021-05-30 PROCEDURE — 93005 ELECTROCARDIOGRAM TRACING: CPT | Performed by: EMERGENCY MEDICINE

## 2021-05-30 PROCEDURE — 99282 EMERGENCY DEPT VISIT SF MDM: CPT

## 2021-05-30 PROCEDURE — 83735 ASSAY OF MAGNESIUM: CPT

## 2021-05-30 PROCEDURE — 36415 COLL VENOUS BLD VENIPUNCTURE: CPT

## 2021-05-30 PROCEDURE — 84703 CHORIONIC GONADOTROPIN ASSAY: CPT

## 2021-05-30 PROCEDURE — 71046 X-RAY EXAM CHEST 2 VIEWS: CPT

## 2021-05-30 PROCEDURE — 80053 COMPREHEN METABOLIC PANEL: CPT

## 2021-05-30 PROCEDURE — 85025 COMPLETE CBC W/AUTO DIFF WBC: CPT

## 2021-05-30 PROCEDURE — 84484 ASSAY OF TROPONIN QUANT: CPT

## 2021-05-30 PROCEDURE — 80156 ASSAY CARBAMAZEPINE TOTAL: CPT

## 2021-05-30 PROCEDURE — 83880 ASSAY OF NATRIURETIC PEPTIDE: CPT

## 2021-05-30 ASSESSMENT — ENCOUNTER SYMPTOMS
VOMITING: 0
SHORTNESS OF BREATH: 1
BACK PAIN: 0
NAUSEA: 0
CONSTIPATION: 0
STRIDOR: 0
WHEEZING: 0
COUGH: 0
ABDOMINAL PAIN: 0
COLOR CHANGE: 0
ABDOMINAL DISTENTION: 0
DIARRHEA: 0

## 2021-05-30 NOTE — ED NOTES
Pt states understanding of discharge instructions. Pt agrees to follow up with referral. Pt denies any further needs at this time. Pt ambulated to exit, denies need for wheelchair, gait steady, all pt belongings with pt.         Kimo Albrecht RN  05/30/21 6203

## 2021-05-30 NOTE — ED PROVIDER NOTES
I independently performed a history and physical on [employer]. All diagnostic, treatment, and disposition decisions were made by myself in conjunction with the advanced practice provider. Briefly, this is a 39 y.o. female here for palpitation sensation, feels like her heart is skipping beats. She feels a fullness sensation in her throat dropped. This has been gone for about a week. She recently started Tegretol therapy for epilepsy but increasing the dose slowly. No true chest pain or trouble breathing the emergency room. Has not tried Holter monitoring yet. Does have history of adrenal insufficiency, spinal stenosis and Cushing's disease.     On exam pt is resting comfortably  Cardiac RRR, no murmur  Lungs clear bilaterally, no increased work of breathing  Abdomen soft nontender  No calf edema or tenderness  Neuro no drift in extremities     XR CHEST (2 VW)   Final Result   No active cardiopulmonary disease           Labs Reviewed   COMPREHENSIVE METABOLIC PANEL - Abnormal; Notable for the following components:       Result Value    ALT 43 (*)     AST 46 (*)     All other components within normal limits    Narrative:     Performed at:  [facility] [address], [facility], [address]   Phone [phone]   CBC WITH AUTO DIFFERENTIAL    Narrative:     Performed at:  [facility] [address], [facility], [address]   Phone [phone]   TROPONIN    Narrative:     Performed at:  [facility] [address], [facility], [address] [place]   Phone [phone] PEPTIDE    Narrative:     Performed at:  [facility] [address], [facility], [address] [place]   Phone [phone]   CARBAMAZEPINE LEVEL, TOTAL    Narrative:     Performed at:  [facility]  [address] [place], [place] [address]   Phone [phone] currently breastfeeding.      For further details of [facility] emergency department encounter, please see documentation by advanced practice provider        Sydni Galdamez MD  05/30/21 1013

## 2021-05-30 NOTE — ED PROVIDER NOTES
[facility]        Pt Name: Raji Rodriguez  MRN: [id number]  [place] 1975  Date of evaluation: 5/30/2021  Provider: Carl Mancilla PA-C  PCP: Andriy Edward MD  Note Started: 1:08 PM EDT        I have seen and evaluated this patient with my supervising physician No att. providers found. CHIEF COMPLAINT       Chief Complaint   Patient presents with    Palpitations     Patient states she feels like her heart is skipping beats, states it feels slow and pounding and can feel it in her throat. HISTORY OF PRESENT ILLNESS   (Location, Timing/Onset, Context/Setting, Quality, Duration, Modifying Factors, Severity, Associated Signs and Symptoms)  Note limiting factors. Raji Rodriguez is a 39 y.o. female  with history of pituitary tumor, Cushing syndrome, PCOS, epilepsy who presents to the emergency department complaining of 1-1/2 weeks of palpitations in her chest radiating into her throat. It feels as though her heart is dropping in her chest and does occasionally cause shortness of breath. A week prior to the onset of her symptoms, she started Tegretol 400 mg twice daily by Dr. Eliseo James with [facility] neurology. She called her neurologist on Friday who ordered outpatient Tegretol level and sodium level however when she went to the lab to get this drawn, there was some issue with the physician order according to registration. Nursing Notes were all reviewed and agreed with or any disagreements were addressed in the HPI. REVIEW OF SYSTEMS    (2-9 systems for level 4, 10 or more for level 5)     Review of Systems   Constitutional: Negative for chills and fever. HENT: Negative. Eyes: Negative for visual disturbance. Respiratory: Positive for shortness of breath. Negative for cough, wheezing and stridor. Cardiovascular: Positive for palpitations. Negative for chest pain and leg swelling. Pharynx: Oropharynx is clear. Eyes:      General: No scleral icterus. Right eye: No discharge. Left eye: No discharge. Extraocular Movements: Extraocular movements intact. Conjunctiva/sclera: Conjunctivae normal.      Pupils: Pupils are equal, round, and reactive to light. Cardiovascular:      Rate and Rhythm: Normal rate. Pulmonary:      Effort: Pulmonary effort is normal.      Breath sounds: Normal breath sounds. Abdominal:      General: Bowel sounds are normal.      Palpations: Abdomen is soft. Tenderness: There is no abdominal tenderness. There is no right CVA tenderness or left CVA tenderness. Musculoskeletal:         General: Normal range of motion. Cervical back: Normal range of motion. Comments: No extremity edema, posterior calf or thigh tenderness, palpable cord, discoloration. Negative homans. Skin:     General: Skin is warm and dry. Capillary Refill: Capillary refill takes less than 2 seconds. Coloration: Skin is not jaundiced or pale. Findings: No bruising, erythema, lesion or rash. Neurological:      General: No focal deficit present. Mental Status: She is alert and oriented to person, place, and time. Psychiatric:         Attention and Perception: Attention and perception normal.         Mood and Affect: Mood is anxious and depressed. Speech: Speech normal.         Behavior: Behavior normal. Behavior is cooperative. Thought Content:  Thought content normal.         Cognition and Memory: Cognition and memory normal.         Judgment: Judgment normal.         DIAGNOSTIC RESULTS   LABS:    Labs Reviewed   COMPREHENSIVE METABOLIC PANEL - Abnormal; Notable for the following components:       Result Value    ALT 43 (*)     AST 46 (*)     All other components within normal limits    Narrative:     Performed at:  [facility] [address], [facility], [address]   Phone [phone] kg)   Height: 5' 2\" (1.575 m)       Patient was given the following medications:  Medications - No data to display      This patient presents to the emergency department with complaints of palpitations for a little over 1 week. She thinks it may be linked to seizure medications. She may address this with her neurologist to see if the dose needs to be adjusted. Tegretol level pending. PCP and/or neurologist may follow-up on these results. Blood work stable. Troponin negative. EKG is unremarkable. Chest x-ray appears stable. My suspicion is low for pneumonia, pneumothorax, COVID-19, anaphylaxis, angioedema, allergic reaction,ACS, PE, myocarditis, pericarditis, endocarditis, acute pulmonary edema, pleural effusion, pericardial effusion, cardiac tamponade, cardiomyopathy, CHF exacerbation, thoracic aortic dissection, esophageal rupture, other life-threatening arrhythmia, hypertensive urgency or emergency, hemothorax, pulmonary contusion, subcutaneous emphysema, flail chest, pneumo mediastinum, rib fracture or other concerning pathology. FINAL IMPRESSION      1.  Palpitations          DISPOSITION/PLAN   DISPOSITION Decision To Discharge 05/30/2021 02:54:30 PM      PATIENT REFERRED TO:  Kimi French MD  [address] [place] [phone]    In 1 week        DISCHARGE MEDICATIONS:  Discharge Medication List as of 5/30/2021  2:56 PM          DISCONTINUED MEDICATIONS:  Discharge Medication List as of 5/30/2021  2:56 PM                 (Please note that portions of this note were completed with a voice recognition program.  Efforts were made to edit the dictations but occasionally words are mis-transcribed.)    Belle Siemens, PA-C (electronically signed)            Belle [employer], PA-C  05/30/21 3943

## 2021-05-31 LAB
CARBAMAZEPINE DOSE: NORMAL
CARBAMAZEPINE LEVEL: 5.1 UG/ML (ref 4–12)
EKG ATRIAL RATE: 79 BPM
EKG DIAGNOSIS: NORMAL
EKG P AXIS: 32 DEGREES
EKG P-R INTERVAL: 142 MS
EKG Q-T INTERVAL: 360 MS
EKG QRS DURATION: 86 MS
EKG QTC CALCULATION (BAZETT): 412 MS
EKG R AXIS: 11 DEGREES
EKG T AXIS: 18 DEGREES
EKG VENTRICULAR RATE: 79 BPM

## 2021-05-31 PROCEDURE — 93010 ELECTROCARDIOGRAM REPORT: CPT | Performed by: INTERNAL MEDICINE

## 2021-06-17 ENCOUNTER — TELEPHONE (OUTPATIENT)
Dept: FAMILY MEDICINE CLINIC | Age: 46
End: 2021-06-17

## 2021-06-17 NOTE — TELEPHONE ENCOUNTER
Obie Willson with [facility] will follow pt for Case Management. Pt was discharged from [facility] June 10th (siezures)  If there is any questions for Obie Willson - feel free to call.

## 2021-06-21 RX ORDER — SPIRONOLACTONE 50 MG/1
TABLET, FILM COATED ORAL
Qty: 60 TABLET | Refills: 3 | Status: SHIPPED | OUTPATIENT
Start: 2021-06-21 | End: 2022-02-18

## 2021-09-20 ENCOUNTER — TELEPHONE (OUTPATIENT)
Dept: FAMILY MEDICINE CLINIC | Age: 46
End: 2021-09-20

## 2021-09-20 NOTE — TELEPHONE ENCOUNTER
Generally would only receive monoclonal antibody infusion if you are sick enough to be in the hospital, possibly even the ICU. So she is at home and is stable, she will need it. She should schedule a virtual visit with me for tomorrow to discuss her symptoms.

## 2021-09-20 NOTE — TELEPHONE ENCOUNTER
Patient states she has several underlying conditions such as Cushing and seizure disorder that she feels as if she should be able to have infusion  Patient had a seizure today and thinks it may be because she has been ill  Patient states that [employer]ionna Nguyen is her insurance now   If an order is placed patient will inform where to fax order

## 2021-09-20 NOTE — TELEPHONE ENCOUNTER
The patient calls in and states she tested COVID-19 Positive Sunday 9/19/21. The patient states she has fever, cough, body aches, stuffy nose, loss of taste and smell. The patient calls in inquiring about monoclonal antibody infusion. The patient wonders if she qualifies to have monoclonal infusion therapy.

## 2022-02-18 ENCOUNTER — OFFICE VISIT (OUTPATIENT)
Dept: FAMILY MEDICINE CLINIC | Age: 47
End: 2022-02-18
Payer: COMMERCIAL

## 2022-02-18 VITALS
WEIGHT: 174 LBS | HEART RATE: 62 BPM | OXYGEN SATURATION: 97 % | SYSTOLIC BLOOD PRESSURE: 110 MMHG | BODY MASS INDEX: 31.83 KG/M2 | DIASTOLIC BLOOD PRESSURE: 78 MMHG

## 2022-02-18 DIAGNOSIS — F32.A DEPRESSION, UNSPECIFIED DEPRESSION TYPE: ICD-10-CM

## 2022-02-18 DIAGNOSIS — R07.9 CHEST PAIN, UNSPECIFIED TYPE: Primary | ICD-10-CM

## 2022-02-18 DIAGNOSIS — G40.909 SEIZURE DISORDER (HCC): ICD-10-CM

## 2022-02-18 PROBLEM — F33.40 RECURRENT MAJOR DEPRESSIVE DISORDER, IN REMISSION (HCC): Status: RESOLVED | Noted: 2017-07-06 | Resolved: 2022-02-18

## 2022-02-18 PROCEDURE — G8427 DOCREV CUR MEDS BY ELIG CLIN: HCPCS | Performed by: FAMILY MEDICINE

## 2022-02-18 PROCEDURE — 99214 OFFICE O/P EST MOD 30 MIN: CPT | Performed by: FAMILY MEDICINE

## 2022-02-18 PROCEDURE — 1036F TOBACCO NON-USER: CPT | Performed by: FAMILY MEDICINE

## 2022-02-18 PROCEDURE — 93000 ELECTROCARDIOGRAM COMPLETE: CPT | Performed by: FAMILY MEDICINE

## 2022-02-18 PROCEDURE — G8484 FLU IMMUNIZE NO ADMIN: HCPCS | Performed by: FAMILY MEDICINE

## 2022-02-18 PROCEDURE — G8417 CALC BMI ABV UP PARAM F/U: HCPCS | Performed by: FAMILY MEDICINE

## 2022-02-18 RX ORDER — LACOSAMIDE 100 MG/1
100 TABLET ORAL DAILY
Qty: 90 TABLET | Refills: 0 | Status: SHIPPED | OUTPATIENT
Start: 2022-02-18 | End: 2022-05-19

## 2022-02-18 RX ORDER — LACOSAMIDE 150 MG/1
150 TABLET ORAL EVERY EVENING
Qty: 90 TABLET | Refills: 0 | Status: SHIPPED | OUTPATIENT
Start: 2022-02-18 | End: 2022-05-19

## 2022-02-18 RX ORDER — FLUOXETINE HYDROCHLORIDE 20 MG/1
60 CAPSULE ORAL DAILY
Qty: 270 CAPSULE | Refills: 1 | Status: SHIPPED | OUTPATIENT
Start: 2022-02-18

## 2022-02-18 RX ORDER — FAMOTIDINE 20 MG/1
20 TABLET, FILM COATED ORAL 2 TIMES DAILY
Qty: 60 TABLET | Refills: 3 | Status: SHIPPED | OUTPATIENT
Start: 2022-02-18

## 2022-02-18 ASSESSMENT — PATIENT HEALTH QUESTIONNAIRE - PHQ9
2. FEELING DOWN, DEPRESSED OR HOPELESS: 1
8. MOVING OR SPEAKING SO SLOWLY THAT OTHER PEOPLE COULD HAVE NOTICED. OR THE OPPOSITE, BEING SO FIGETY OR RESTLESS THAT YOU HAVE BEEN MOVING AROUND A LOT MORE THAN USUAL: 0
SUM OF ALL RESPONSES TO PHQ QUESTIONS 1-9: 9
3. TROUBLE FALLING OR STAYING ASLEEP: 3
7. TROUBLE CONCENTRATING ON THINGS, SUCH AS READING THE NEWSPAPER OR WATCHING TELEVISION: 1
6. FEELING BAD ABOUT YOURSELF - OR THAT YOU ARE A FAILURE OR HAVE LET YOURSELF OR YOUR FAMILY DOWN: 0
SUM OF ALL RESPONSES TO PHQ9 QUESTIONS 1 & 2: 2
SUM OF ALL RESPONSES TO PHQ QUESTIONS 1-9: 9
10. IF YOU CHECKED OFF ANY PROBLEMS, HOW DIFFICULT HAVE THESE PROBLEMS MADE IT FOR YOU TO DO YOUR WORK, TAKE CARE OF THINGS AT HOME, OR GET ALONG WITH OTHER PEOPLE: 1
SUM OF ALL RESPONSES TO PHQ QUESTIONS 1-9: 9
1. LITTLE INTEREST OR PLEASURE IN DOING THINGS: 1
4. FEELING TIRED OR HAVING LITTLE ENERGY: 3
SUM OF ALL RESPONSES TO PHQ QUESTIONS 1-9: 9
5. POOR APPETITE OR OVEREATING: 0
9. THOUGHTS THAT YOU WOULD BE BETTER OFF DEAD, OR OF HURTING YOURSELF: 0

## 2022-02-18 NOTE — PATIENT INSTRUCTIONS
Patient Education        Musculoskeletal Chest Pain: Care Instructions  Your Care Instructions     Chest pain is not always a sign that something is wrong with your heart or that you have another serious problem. The doctor thinks your chest pain is caused by strained muscles or ligaments, inflamed chest cartilage, or another problem in your chest, rather than by your heart. You may need more tests to find the cause of your chest pain. Follow-up care is a key part of your treatment and safety. Be sure to make and go to all appointments, and call your doctor if you are having problems. It's also a good idea to know your test results and keep a list of the medicines you take. How can you care for yourself at home? · Take pain medicines exactly as directed. ? If the doctor gave you a prescription medicine for pain, take it as prescribed. ? If you are not taking a prescription pain medicine, ask your doctor if you can take an over-the-counter medicine. · Rest and protect the sore area. · Stop, change, or take a break from any activity that may be causing your pain or soreness. · Put ice or a cold pack on the sore area for 10 to 20 minutes at a time. Try to do this every 1 to 2 hours for the next 3 days (when you are awake) or until the swelling goes down. Put a thin cloth between the ice and your skin. · After 2 or 3 days, apply a heating pad set on low or a warm cloth to the area that hurts. Some doctors suggest that you go back and forth between hot and cold. · Do not wrap or tape your ribs for support. This may cause you to take smaller breaths, which could increase your risk of lung problems. · Mentholated creams such as Bengay or Icy Hot may soothe sore muscles. Follow the instructions on the package. · Follow your doctor's instructions for exercising. · Gentle stretching and massage may help you get better faster.  Stretch slowly to the point just before pain begins, and hold the stretch for at least 15 to 30 seconds. Do this 3 or 4 times a day. Stretch just after you have applied heat. · As your pain gets better, slowly return to your normal activities. Any increased pain may be a sign that you need to rest a while longer. When should you call for help? Call 911 anytime you think you may need emergency care. For example, call if:    · You have chest pain or pressure. This may occur with:  ? Sweating. ? Shortness of breath. ? Nausea or vomiting. ? Pain that spreads from the chest to the neck, jaw, or one or both shoulders or arms. ? Dizziness or lightheadedness. ? A fast or uneven pulse. After calling 911, chew 1 adult-strength aspirin. Wait for an ambulance. Do not try to drive yourself.     · You have sudden chest pain and shortness of breath, or you cough up blood. Call your doctor now or seek immediate medical care if:    · You have any trouble breathing.     · Your chest pain gets worse.     · Your chest pain occurs consistently with exercise and is relieved by rest.   Watch closely for changes in your health, and be sure to contact your doctor if:    · Your chest pain does not get better after 1 week. Where can you learn more? Go to https://[employer]. org and sign in to your [employer] account. Enter 7452 [id number] in the [facility] box to learn more about \"Musculoskeletal Chest Pain: Care Instructions. \"     If you do not have an account, please click on the \"Sign Up Now\" link. Current as of: July 1, 2021               Content Version: 13.1  © 2006-2021 Healthwise, Incorporated. Care instructions adapted under license by [facility] ([facility]). If you have questions about a medical condition or this instruction, always ask your healthcare professional. [place] [address] any warranty or liability for your use of this information.

## 2022-02-18 NOTE — PROGRESS NOTES
[address] Note    Date: 2/18/2022                                               Sean Mccormick:     Chief Complaint   Patient presents with    Chest Pain       HPI   R sided chest wall pain starting a month ago. Comes and goes. Had an episode where she ate and the chest hurt as the food was going down, but this resolved, eating and drinking okay today. No abdominal pain. Does have occasional heartburn and belching. Is not worse with moving R arm. No SOB. No n/v. Patient Active Problem List    Diagnosis Date Noted    Seizure disorder ([facility] 05/10/2021    PCOS (polycystic ovarian syndrome) 07/06/2017    Gluten intolerance 07/06/2017    Hair loss 07/06/2017    Gastroesophageal reflux disease without esophagitis 07/06/2017    Hyperlipidemia 07/06/2017    Obesity 07/06/2017       Past Medical History:   Diagnosis Date    Cushing syndrome ([facility]     Depression     Female pattern hair loss     H/O: pituitary tumor     Hypercorticism ([facility]     Insulin resistance     Obesity     PCOS (polycystic ovarian syndrome)     Seizures (HCC)     Spinal stenosis     L5       Current Outpatient Medications   Medication Sig Dispense Refill    famotidine (PEPCID) 20 MG tablet Take 1 tablet by mouth 2 times daily 60 tablet 3    lacosamide (VIMPAT) 100 MG TABS tablet Take 1 tablet by mouth daily for 90 days. 90 tablet 0    lacosamide (VIMPAT) 150 MG TABS tablet Take 1 tablet by mouth every evening for 90 days. 90 tablet 0    FLUoxetine (PROZAC) 20 MG capsule Take 3 capsules by mouth daily 270 capsule 1    hydrocortisone (CORTEF) 5 MG tablet 10 mg breakfast, 7.5 mg lunch and 5 mg dinner.  b complex vitamins capsule Take 1 capsule by mouth daily      traZODone (DESYREL) 100 MG tablet Take 1 tablet by mouth nightly (Patient taking differently: Take 50 mg by mouth nightly ) 30 tablet 2     No current facility-administered medications for this visit.        Allergies   Allergen Reactions  Lamotrigine Rash    Penicillins Hives and Rash    Sulfa Antibiotics Rash    Gluten Meal     Penciclovir Rash       Review of Systems   No HA, no fever    Vitals:  /78   Pulse 62   Wt 174 lb (78.9 kg)   SpO2 97%   BMI 31.83 kg/m²     Wt Readings from Last 3 Encounters:   02/18/22 174 lb (78.9 kg)   05/30/21 200 lb (90.7 kg)   05/10/21 203 lb 6.4 oz (92.3 kg)        Physical Exam   General:  Well-appearing, NAD, alert, non-toxic  HEENT:  Normocephalic, atraumatic. Pupils equal and round. CHEST/LUNGS: CTAB, no crackles, no wheeze, no rhonchi. Symmetric rise. No tenderness palpation of chest wall. CARDIOVASCULAR: RRR,  no murmur, no rub  EXTREMETIES: Normal movement of all extremities  SKIN:  No rash, no cellulitis, no bruising, no petechiae/purpura/vesicles/pustules/abscess  PSYCH:  A+O x 3; normal affect  NEURO:  GCS 15, CN2-12 grossly intact, no focal motor/sensory deficits, no cerebellar deficits, normal gait, normal speech      Assessment/Plan     42-year-old female with right-sided chest wall pain. This is of unclear etiology. ACS seems unlikely given patient's age and lack of risk factors and normal EKG. More likely GERD given patient's occasional heartburn and indigestion. Will treat with Pepcid. Check chest x-ray to rule out bony abnormality. Patient advised to follow-up symptoms not improved in 3 to 5 days, may need anti-inflammatory medication. Patient has history of seizure disorder. Recently switched to Vimpat and has not had any grand mal seizures. Continue present medicines. Follow-up with neurology as scheduled. Patient has history of depression. Is well controlled on Prozac. Continue present management.     Discussed with patient medication/s dosage, instructions, potential S/E, A/R and Drug Interaction  Tylenol or Motrin as needed for pain or fever  Advise to return here if worse or go to nearest ER  Encourage fluids  Pt discharged in stable condition at 14: 50      1. Chest pain, unspecified type  -     EKG 12 lead  -     famotidine (PEPCID) 20 MG tablet; Take 1 tablet by mouth 2 times daily, Disp-60 tablet, R-3Normal  -     XR CHEST (2 VW); Future  2. Seizure disorder (HCC)  -     lacosamide (VIMPAT) 100 MG TABS tablet; Take 1 tablet by mouth daily for 90 days. , Disp-90 tablet, R-0Print  -     lacosamide (VIMPAT) 150 MG TABS tablet; Take 1 tablet by mouth every evening for 90 days. , Disp-90 tablet, R-0Print  3. Depression, unspecified depression type  -     FLUoxetine (PROZAC) 20 MG capsule; Take 3 capsules by mouth daily, Disp-270 capsule, R-1Print       Orders Placed This Encounter   Procedures    XR CHEST (2 VW)     Standing Status:   Future     Standing Expiration Date:   2/18/2023    EKG 12 lead     Order Specific Question:   Reason for Exam?     Answer:   Chest pain       Return if symptoms worsen or fail to improve.     Leonel Boeck, MD, MD    2/18/2022  3:04 PM

## 2022-02-21 ENCOUNTER — TELEPHONE (OUTPATIENT)
Dept: FAMILY MEDICINE CLINIC | Age: 47
End: 2022-02-21

## 2022-02-21 DIAGNOSIS — R07.89 CHEST WALL PAIN: Primary | ICD-10-CM

## 2022-02-21 RX ORDER — NAPROXEN 500 MG/1
500 TABLET ORAL 2 TIMES DAILY WITH MEALS
Qty: 60 TABLET | Refills: 0 | Status: SHIPPED | OUTPATIENT
Start: 2022-02-21

## 2022-02-21 NOTE — TELEPHONE ENCOUNTER
Patient calling,    States that she was told by Dr. Jones Just to call Monday if not feeling better from the chest pain that occurred on Friday. Patient states that it is coming in going especially on the right side. Wanting to know the next steps.

## 2022-02-21 NOTE — TELEPHONE ENCOUNTER
Her symptoms are ongoing, will go ahead and treat her with naproxen, which is an anti-inflammatory medicine. We will also check a CT scan of the chest to make sure there is nothing abnormal going on. I placed an order in [facility], she should call [phone] to schedule.

## 2022-02-22 ENCOUNTER — TELEPHONE (OUTPATIENT)
Dept: FAMILY MEDICINE CLINIC | Age: 47
End: 2022-02-22

## 2022-02-22 NOTE — TELEPHONE ENCOUNTER
----- Message from Anamika Tyler sent at 2/22/2022  8:18 AM EST -----  Subject: Message to Provider    QUESTIONS  Information for Provider? patient calling in to have a CT scan completed   at [facility] and patient stated they need order faxed to them to schedule   patient, bob#[phone] Atten? Scheduling @ [facility],   #[phone]  ---------------------------------------------------------------------------  --------------  Sugey ROGERS  What is the best way for the office to contact you? OK to leave message on   voicemail, OK to respond with electronic message via [employer] portal (only   for patients who have registered [employer] account)  Preferred Call Back Phone Number? [phone]  ---------------------------------------------------------------------------  --------------  SCRIPT ANSWERS  Relationship to Patient?  Self

## 2022-02-23 ENCOUNTER — TELEPHONE (OUTPATIENT)
Dept: FAMILY MEDICINE CLINIC | Age: 47
End: 2022-02-23

## 2022-02-23 DIAGNOSIS — R16.0 LIVER MASS: Primary | ICD-10-CM

## 2022-02-23 NOTE — TELEPHONE ENCOUNTER
The patient calls in and states she has the results of CT scan for chest pain in [facility]. The patient is concerned with a finding involving her liver. Results not in chart. Please advise.

## 2022-02-23 NOTE — TELEPHONE ENCOUNTER
Called and spoke with patient on the phone. Her CT results showed normal lungs and chest and bones and muscles, though she did have a 2.1 cm mass in the liver, said to be a likely hemangioma. I explained to the patient this is likely a benign finding, we will check a CT scan in 6 months to ensure stability. Patient expressed understanding.

## 2022-05-05 NOTE — TELEPHONE ENCOUNTER
lov 2/14/20  lrf 180 0 12/1/20  Pt needs an apt
Quality 226: Preventive Care And Screening: Tobacco Use: Screening And Cessation Intervention: Patient screened for tobacco use and is an ex/non-smoker
Quality 130: Documentation Of Current Medications In The Medical Record: Current Medications Documented
Quality 111:Pneumonia Vaccination Status For Older Adults: Pneumococcal Vaccination Previously Received
Detail Level: Detailed

## 2023-03-16 ENCOUNTER — OFFICE VISIT (OUTPATIENT)
Dept: ENT CLINIC | Age: 48
End: 2023-03-16
Payer: MEDICARE

## 2023-03-16 VITALS
BODY MASS INDEX: 30 KG/M2 | HEIGHT: 62 IN | OXYGEN SATURATION: 98 % | DIASTOLIC BLOOD PRESSURE: 68 MMHG | SYSTOLIC BLOOD PRESSURE: 102 MMHG | TEMPERATURE: 97.1 F | HEART RATE: 62 BPM | WEIGHT: 163 LBS

## 2023-03-16 DIAGNOSIS — K21.9 LARYNGOPHARYNGEAL REFLUX (LPR): ICD-10-CM

## 2023-03-16 DIAGNOSIS — R09.89 GLOBUS PHARYNGEUS: Primary | ICD-10-CM

## 2023-03-16 DIAGNOSIS — J34.3 HYPERTROPHY OF BOTH INFERIOR NASAL TURBINATES: ICD-10-CM

## 2023-03-16 DIAGNOSIS — J30.9 ALLERGIC RHINITIS, UNSPECIFIED SEASONALITY, UNSPECIFIED TRIGGER: ICD-10-CM

## 2023-03-16 PROCEDURE — 99204 OFFICE O/P NEW MOD 45 MIN: CPT | Performed by: STUDENT IN AN ORGANIZED HEALTH CARE EDUCATION/TRAINING PROGRAM

## 2023-03-16 PROCEDURE — G8419 CALC BMI OUT NRM PARAM NOF/U: HCPCS | Performed by: STUDENT IN AN ORGANIZED HEALTH CARE EDUCATION/TRAINING PROGRAM

## 2023-03-16 PROCEDURE — 31575 DIAGNOSTIC LARYNGOSCOPY: CPT | Performed by: STUDENT IN AN ORGANIZED HEALTH CARE EDUCATION/TRAINING PROGRAM

## 2023-03-16 PROCEDURE — G8484 FLU IMMUNIZE NO ADMIN: HCPCS | Performed by: STUDENT IN AN ORGANIZED HEALTH CARE EDUCATION/TRAINING PROGRAM

## 2023-03-16 PROCEDURE — 1036F TOBACCO NON-USER: CPT | Performed by: STUDENT IN AN ORGANIZED HEALTH CARE EDUCATION/TRAINING PROGRAM

## 2023-03-16 PROCEDURE — G8427 DOCREV CUR MEDS BY ELIG CLIN: HCPCS | Performed by: STUDENT IN AN ORGANIZED HEALTH CARE EDUCATION/TRAINING PROGRAM

## 2023-03-16 RX ORDER — PANTOPRAZOLE SODIUM 40 MG/1
40 TABLET, DELAYED RELEASE ORAL
Qty: 90 TABLET | Refills: 0 | Status: SHIPPED | OUTPATIENT
Start: 2023-03-16

## 2023-03-16 RX ORDER — LEVOTHYROXINE SODIUM 0.03 MG/1
TABLET ORAL
COMMUNITY
Start: 2023-01-31

## 2023-03-16 NOTE — PROGRESS NOTES
[facility] PATIENT HISTORY AND PHYSICAL NOTE      Patient Name: Fran Tapia Record Number:  [id number]  Primary Care Physician:  Marlys Bolanos MD    ChiefComplaint     Chief Complaint   Patient presents with    Other     Feels like something is stuck in the throat, bump in the back of throat       History of Present Illness     Brigida Dowling is an 41 y.o. female presenting with feeling like something is stuck in her throat for the last couple months. Has not worsened. 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 Since then sinus issues have worsened. Diagnosed with COVID sept 2021, sense of smell decreased since but is slowly coming back. Doing well from cusings standpoint, has lost 65 pounds. + congestion and post nasal draiange. Uses saline irrigations occasionally. No nasal sprays. She has a history of epilepsy and does not like taking any medications that could possibly interfere with her antiepileptics. She is unable to use any stimulants. He does get heartburn when she eats tomato-based foods. She takes Tums as needed but infrequently. + Chronic throat clearing throughout the day. Denies dysphagia, voice changes, sore throat.       Past Medical History     Past Medical History:   Diagnosis Date    Cushing syndrome ([address]     Depression     Female pattern hair loss     H/O: pituitary tumor     Hypercorticism (HCC)     Insulin resistance     Obesity     PCOS (polycystic ovarian syndrome)     Seizures ([address]     Spinal stenosis     L5       Past Surgical History     Past Surgical History:   Procedure Laterality Date    INCONTINENCE SURGERY      OVARIAN CYST REMOVAL Right     PLANTAR FASCIA SURGERY Bilateral     TUMOR REMOVAL      pituitary gland        Family History     Family History   Problem Relation Age of Onset    High Blood Pressure Mother     Thyroid Disease Mother     Anxiety Disorder Mother     Liver Disease Father 61    Diabetes Father     Colon Cancer Maternal Grandmother         Mid 45s    Heart Disease Maternal Grandfather     Diabetes Maternal Grandfather     Heart Disease Paternal Grandmother     Diabetes Paternal Grandmother     Breast Cancer Paternal Grandmother 61    Breast Cancer Paternal Aunt 72    Other Daughter         High functioning autism and ADHD       Social History     Social History     Tobacco Use    Smoking status: Former     Packs/day: 1.00     Years: 8.00     Pack years: 8.00     Types: Cigarettes    Smokeless tobacco: Former     Quit date: 1/1/1998   Vaping Use    Vaping Use: Never used   Substance Use Topics    Alcohol use: No    Drug use: No        Allergies     Allergies   Allergen Reactions    Lamotrigine Rash    Penicillins Hives and Rash    Sulfa Antibiotics Rash    Gluten Meal     Penciclovir Rash       Medications     Current Outpatient Medications   Medication Sig Dispense Refill    levothyroxine (SYNTHROID) 25 MCG tablet TAKE ONE TABLET BY MOUTH DAILY      pantoprazole (PROTONIX) 40 MG tablet Take 1 tablet by mouth every morning (before breakfast) 90 tablet 0    lacosamide (VIMPAT) 150 MG TABS tablet Take 1 tablet by mouth every evening for 90 days. 90 tablet 0    b complex vitamins capsule Take 1 capsule by mouth daily      traZODone (DESYREL) 100 MG tablet Take 1 tablet by mouth nightly (Patient taking differently: Take 50 mg by mouth nightly) 30 tablet 2    naproxen (NAPROSYN) 500 MG tablet Take 1 tablet by mouth 2 times daily (with meals) (Patient not taking: Reported on 3/16/2023) 60 tablet 0    famotidine (PEPCID) 20 MG tablet Take 1 tablet by mouth 2 times daily (Patient not taking: Reported on 3/16/2023) 60 tablet 3    lacosamide (VIMPAT) 100 MG TABS tablet Take 1 tablet by mouth daily for 90 days.  (Patient not taking: Reported on 3/16/2023) 90 tablet 0    FLUoxetine (PROZAC) 20 MG capsule Take 3 capsules by mouth daily (Patient not taking: Reported on 3/16/2023) 270 capsule 1    hydrocortisone (CORTEF) 5 MG tablet 10 mg breakfast, 7.5 mg lunch and 5 mg dinner. (Patient not taking: Reported on 3/16/2023)       No current facility-administered medications for this visit. Review of Systems     REVIEW OF SYSTEMS    See HPI Above    PhysicalExam     Vitals:    03/16/23 1040   BP: 102/68   Site: Left Upper Arm   Position: Sitting   Cuff Size: Medium Adult   Pulse: 62   Temp: 97.1 °F (36.2 °C)   TempSrc: Temporal   SpO2: 98%   Weight: 163 lb (73.9 kg)   Height: 5' 2\" (1.575 m)       PHYSICAL EXAM  /68 (Site: Left Upper Arm, Position: Sitting, Cuff Size: Medium Adult)   Pulse 62   Temp 97.1 °F (36.2 °C) (Temporal)   Ht 5' 2\" (1.575 m)   Wt 163 lb (73.9 kg)   SpO2 98%   BMI 29.81 kg/m²     GENERAL: No acute distress, alert and oriented  EYES: EOMI, Anti-icteric  NOSE: On anterior rhinoscopy there is no epistaxis, nasal mucosa moist and normal appearing, no purulent drainage. Bilateral inferior turbinate hypertrophy. EARS: Normal external appearance; on portable otomicroscopy:     -Ad: External auditory canal without stenosis, tympanic membrane clear, no middle ear effusions or retractions.      -As: External auditory canal without stenosis, tympanic membrane clear, no middle ear effusions or retractions. Pneumatic otoscopy: Bilateral tympanic membranes mobile pneumatic otoscopy  FACE: HB 1/6 bilaterally, symmetric appearing, sensation equal bilaterally  ORAL CAVITY: No masses or lesions visualized or palpated, uvula is midline, moist mucous membranes, no oropharyngeal masses or oropharyngeal obstruction. Tonsils 2+ bilaterally.    NECK: Normal range of motion, no thyromegaly, trachea is midline, no palpable lymphadenopathy or neck masses, no crepitus  NEURO: Cranial Nerves 2, 3, 4, 5, 6, 7, 11, 12 grossly intact bilaterally     I have performed a head and neck physical exam personally or was physically present during the key or critical portions of the service. Procedure     Procedure: Flexible Laryngoscopy    Pre-op: Globus pharyngeus  Post-op: Same  Procedure : Flexible Nasopharyngolaryngoscopy  Surgeon: Dr. Christiana Lobo DO  Anesthesia: Afrin with 2% lidocaine  Estimated Blood Loss: None    Description of Procedure:  After obtaining consent, the patient was placed in the examination chair in the upright position. Decongestant and topical anesthetic was sprayed in the bilateral nares and after allowing adequate time for hemostatic effect, the flexible 4 mm laryngoscope was passed via the right nasal passage. Nasal Septum: No acute septal deviation, no septal hematoma or perforations noted   Nasal Findings: The bilateral ethmoid sinuses are wide open and there are postsurgical changes related to her prior transsphenoidal pituitary surgery. No obvious CSF rhinorrhea. Nasopharynx: Clear, no masses or inflammation  Oropharynx: No exophytic or ulcerative lesions, mucosa appears within normal limits  Base of Tongue: Lingual tonsils within normal limits, vallecula without effacement  Epiglottis: Upright, in normal anatomical position  True Vocal Cord: Anatomically normal, no masses or inflammation, normal abduction and adduction upon inspiration and phonation  False Vocal Cord: normal appearing without masses  Hypopharynx Mucosa: + Moderate postcricoid area edema  Arytenoids: Normal mucosa, no dislocation appreciated     * Patient tolerated the procedure well with no complications   * Patient was instructed not to eat for 30 minutes following procedure. * Patient was instructed that they may notice minor bleeding. Assessment and Plan     1. Globus pharyngeus  -No neoplastic or ulcerative masses noted on flexible laryngoscopy. Patient reassured. Globus sensation likely secondary to underlying reflux.     2. Laryngopharyngeal reflux (LPR)  -Start Protonix 40 mg once daily in the morning prior to eating breakfast.  We will trial her on this for 3 months and she will follow-up at that time for reevaluation  -Discussed conservative management lifestyle modification strategies for reflux treatment including:      -Avoidance of late night eating and lying down soon after eating. Consider lying down and sleeping in a reclined position as to reduce the gravity effects of acid reflux.        -Avoidance of trigger foods that could worsen reflux including alcohol, excessively salty foods, spicy foods, acidic foods, chocolate, peppermint, fatty foods, coffee. 3. Allergic rhinitis, unspecified seasonality, unspecified trigger  4. Hypertrophy of both inferior nasal turbinates  -She is averse to taking nasal sprays as she feels like this may interfere with her antiepileptic medications. Recommend starting on utilizing nasal saline irrigations on a daily basis      Follow Up     Return in 3 months (on 6/16/2023), or if symptoms worsen or fail to improve. [facility]   Department of Otolaryngology/Head & Neck Surgery  3/16/23    Medical Decision Making: The following items were considered in medical decision making:  Independent review of images  Review / order clinical lab tests  Review / order radiology tests  Decision to obtain old records    This note was generated completely or in part utilizing Dragon dictation speech recognition software. Occasionally, words are mistranscribed and despite editing, the text may contain inaccuracies due to incorrect word recognition. If further clarification is needed please contact the office at [phone].

## 2023-05-09 ENCOUNTER — HOSPITAL ENCOUNTER (OUTPATIENT)
Dept: WOMENS IMAGING | Age: 48
Discharge: HOME | End: 2023-05-09
Payer: COMMERCIAL

## 2023-05-09 VITALS — WEIGHT: 160 LBS | BODY MASS INDEX: 29.26 KG/M2

## 2023-05-09 DIAGNOSIS — Z12.31 SCREENING MAMMOGRAM FOR BREAST CANCER: ICD-10-CM

## 2023-05-09 PROCEDURE — 77063 BREAST TOMOSYNTHESIS BI: CPT

## 2023-05-13 NOTE — ED PROVIDER NOTES
EKG is reviewed by myself. Dated today at 1. Rate 79 sinus rhythm otherwise normal EKG. There is one PAC. No change in November 2020.      Justa Lopez MD  05/30/21 [id number] 4 = No assist / stand by assistance

## 2023-06-05 ENCOUNTER — OFFICE VISIT (OUTPATIENT)
Dept: ENT CLINIC | Age: 48
End: 2023-06-05
Payer: COMMERCIAL

## 2023-06-05 VITALS
SYSTOLIC BLOOD PRESSURE: 102 MMHG | HEART RATE: 64 BPM | WEIGHT: 163 LBS | DIASTOLIC BLOOD PRESSURE: 66 MMHG | HEIGHT: 62 IN | OXYGEN SATURATION: 96 % | TEMPERATURE: 97.3 F | BODY MASS INDEX: 30 KG/M2

## 2023-06-05 DIAGNOSIS — K21.9 LARYNGOPHARYNGEAL REFLUX (LPR): ICD-10-CM

## 2023-06-05 DIAGNOSIS — K21.9 GASTROESOPHAGEAL REFLUX DISEASE, UNSPECIFIED WHETHER ESOPHAGITIS PRESENT: ICD-10-CM

## 2023-06-05 DIAGNOSIS — J35.8 TONSIL STONE: ICD-10-CM

## 2023-06-05 DIAGNOSIS — R09.82 PND (POST-NASAL DRIP): Primary | ICD-10-CM

## 2023-06-05 PROCEDURE — G8419 CALC BMI OUT NRM PARAM NOF/U: HCPCS | Performed by: STUDENT IN AN ORGANIZED HEALTH CARE EDUCATION/TRAINING PROGRAM

## 2023-06-05 PROCEDURE — 1036F TOBACCO NON-USER: CPT | Performed by: STUDENT IN AN ORGANIZED HEALTH CARE EDUCATION/TRAINING PROGRAM

## 2023-06-05 PROCEDURE — 99213 OFFICE O/P EST LOW 20 MIN: CPT | Performed by: STUDENT IN AN ORGANIZED HEALTH CARE EDUCATION/TRAINING PROGRAM

## 2023-06-05 PROCEDURE — G8427 DOCREV CUR MEDS BY ELIG CLIN: HCPCS | Performed by: STUDENT IN AN ORGANIZED HEALTH CARE EDUCATION/TRAINING PROGRAM

## 2023-06-05 NOTE — PROGRESS NOTES
High functioning autism and ADHD       Social History     Social History     Tobacco Use    Smoking status: Former     Packs/day: 1.00     Years: 8.00     Pack years: 8.00     Types: Cigarettes    Smokeless tobacco: Former     Quit date: 1/1/1998   Vaping Use    Vaping Use: Never used   Substance Use Topics    Alcohol use: No    Drug use: No        Allergies     Allergies   Allergen Reactions    Lamotrigine Rash    Penicillins Hives and Rash    Sulfa Antibiotics Rash    Gluten Meal     Penciclovir Rash       Medications     Current Outpatient Medications   Medication Sig Dispense Refill    levothyroxine (SYNTHROID) 25 MCG tablet TAKE ONE TABLET BY MOUTH DAILY      pantoprazole (PROTONIX) 40 MG tablet Take 1 tablet by mouth every morning (before breakfast) 90 tablet 0    lacosamide (VIMPAT) 150 MG TABS tablet Take 1 tablet by mouth every evening for 90 days. 90 tablet 0    FLUoxetine (PROZAC) 20 MG capsule Take 3 capsules by mouth daily 270 capsule 1    b complex vitamins capsule Take 1 capsule by mouth daily      traZODone (DESYREL) 100 MG tablet Take 1 tablet by mouth nightly (Patient taking differently: Take 0.5 tablets by mouth nightly) 30 tablet 2    naproxen (NAPROSYN) 500 MG tablet Take 1 tablet by mouth 2 times daily (with meals) (Patient not taking: Reported on 3/16/2023) 60 tablet 0    famotidine (PEPCID) 20 MG tablet Take 1 tablet by mouth 2 times daily (Patient not taking: Reported on 3/16/2023) 60 tablet 3    lacosamide (VIMPAT) 100 MG TABS tablet Take 1 tablet by mouth daily for 90 days. (Patient not taking: Reported on 3/16/2023) 90 tablet 0    hydrocortisone (CORTEF) 5 MG tablet 10 mg breakfast, 7.5 mg lunch and 5 mg dinner. (Patient not taking: Reported on 3/16/2023)       No current facility-administered medications for this visit.        Review of Systems     REVIEW OF SYSTEM: See HPI above    PhysicalExam     Vitals:    06/05/23 0931   BP: 102/66   Site: Right Upper Arm   Position: Sitting   Cuff

## 2023-06-19 ENCOUNTER — HOSPITAL ENCOUNTER (OUTPATIENT)
Dept: WOMENS IMAGING | Age: 48
Discharge: HOME | End: 2023-06-19
Payer: COMMERCIAL

## 2023-06-19 DIAGNOSIS — Z98.890 STATUS POST LEFT BREAST BIOPSY: ICD-10-CM

## 2023-06-19 DIAGNOSIS — R92.8 ABNORMAL MAMMOGRAM: ICD-10-CM

## 2023-06-19 PROCEDURE — G0279 TOMOSYNTHESIS, MAMMO: HCPCS

## 2023-06-19 PROCEDURE — 76098 X-RAY EXAM SURGICAL SPECIMEN: CPT

## 2023-06-19 PROCEDURE — 77065 DX MAMMO INCL CAD UNI: CPT

## 2023-06-19 PROCEDURE — 2500000003 HC RX 250 WO HCPCS: Performed by: OBSTETRICS & GYNECOLOGY

## 2023-06-19 RX ORDER — LIDOCAINE HYDROCHLORIDE AND EPINEPHRINE 10; 10 MG/ML; UG/ML
10 INJECTION, SOLUTION INFILTRATION; PERINEURAL ONCE
Status: COMPLETED | OUTPATIENT
Start: 2023-06-19 | End: 2023-06-19

## 2023-06-19 RX ORDER — LIDOCAINE HYDROCHLORIDE 10 MG/ML
1 INJECTION, SOLUTION EPIDURAL; INFILTRATION; INTRACAUDAL; PERINEURAL ONCE
Status: COMPLETED | OUTPATIENT
Start: 2023-06-19 | End: 2023-06-19

## 2023-06-19 RX ADMIN — LIDOCAINE HYDROCHLORIDE,EPINEPHRINE BITARTRATE 10 ML: 10; .01 INJECTION, SOLUTION INFILTRATION; PERINEURAL at 11:45

## 2023-06-19 RX ADMIN — LIDOCAINE HYDROCHLORIDE 1 ML: 10 INJECTION, SOLUTION EPIDURAL; INFILTRATION; INTRACAUDAL; PERINEURAL at 11:40

## 2023-06-19 ASSESSMENT — PAIN SCALES - GENERAL: PAINLEVEL_OUTOF10: 0

## 2023-06-19 NOTE — PROGRESS NOTES
Patient here for breast biopsy. NN reviewed the health history, allergies and medications. Family member  Suad Cali with her. Radiologist reviews procedure with patient, consent signed. Patient tolerates procedure well initially but then became faint/said she felt like passing out, began to vasovagel with eyes rolling back so procedure stopped just before she completely passed out. Compression held and pt put in trendelenburg for 15 minutes cold wash cloth applied and recovered. . Site cleansed with chloraprep, and dry dressing applied. Ice pack in place. Reviewed discharge instructions with patient. . Patient verbalized understanding and agreed to contact Nurse Navigator with any questions. Patient A&Ox3, she was still a bit worn out so assisted to car by wheelchair and discharged home. Plan to have to contacted by [facility] for a repeat stereo using their table and she is going to reach out to her neurologist for an Ativan order. Sent [facility] navigators information to reach out to her tomorrow for an appointment. Also suggested not using Epi, though not allergic but due to seizure hx, though she tolerated it fine here. NN called at 5pm and pt doing fine and fully recovering, resting comfortably at home.

## 2023-06-30 ENCOUNTER — HOSPITAL ENCOUNTER (OUTPATIENT)
Dept: WOMENS IMAGING | Age: 48
Discharge: HOME | End: 2023-06-30
Payer: COMMERCIAL

## 2023-06-30 DIAGNOSIS — R92.8 ABNORMAL MAMMOGRAM: ICD-10-CM

## 2023-06-30 PROCEDURE — 76098 X-RAY EXAM SURGICAL SPECIMEN: CPT

## 2023-06-30 PROCEDURE — 77065 DX MAMMO INCL CAD UNI: CPT

## 2023-06-30 PROCEDURE — 19081 BX BREAST 1ST LESION STRTCTC: CPT

## 2023-07-05 ENCOUNTER — TELEPHONE (OUTPATIENT)
Dept: WOMENS IMAGING | Age: 48
End: 2023-07-05

## 2023-07-05 NOTE — TELEPHONE ENCOUNTER
Pathology results complete from breast biopsy. Radiologist confirms concordance. Breast Navigator reviewed results of breast biopsy with patient. Results are negative for any malignancy on the pathology report. The radiologist is recommending that patient return to a screening mammogram, due 5/2024. Patient verbalized understanding. Path report faxed to referring physician.      Yoselyn Anton RN

## 2023-09-05 RX ORDER — PANTOPRAZOLE SODIUM 40 MG/1
TABLET, DELAYED RELEASE ORAL
Qty: 90 TABLET | Refills: 0 | Status: SHIPPED | OUTPATIENT
Start: 2023-09-05

## 2023-10-30 ENCOUNTER — HOSPITAL ENCOUNTER (OUTPATIENT)
Age: 48
Discharge: HOME | End: 2023-10-30
Payer: MEDICARE

## 2023-10-30 PROCEDURE — 80235 DRUG ASSAY LACOSAMIDE: CPT

## 2023-11-05 LAB — LACOSAMIDE SERPL-MCNC: 9.4 UG/ML (ref 1–10)

## 2024-02-02 ENCOUNTER — TELEPHONE (OUTPATIENT)
Dept: ENT CLINIC | Age: 49
End: 2024-02-02

## 2024-02-02 NOTE — TELEPHONE ENCOUNTER
I called and spoke with Taylor from the [facility].  She states that there was noted to be fluid in the sphenoid sinus under the nasal septal flap, possible concern for CSF leak.  Surgery was performed in 2020 for Cushing's tumor.    Can someone from our office please call the patient and have her move up her appointment to next week or the week after, can double book.

## 2024-02-02 NOTE — TELEPHONE ENCOUNTER
Nurse from [facility] called in stating she would like pt to be seen sooner than her appt 2/27. They are concerned about fluid under nasal flap. Nurse stated if you needed more in depth details you could give her a call [phone] her name is Taylor. Or if your ok with just overbooking for a sooner appt. Please advise. Thank you.

## 2024-02-06 ENCOUNTER — OFFICE VISIT (OUTPATIENT)
Dept: ENT CLINIC | Age: 49
End: 2024-02-06
Payer: COMMERCIAL

## 2024-02-06 VITALS
BODY MASS INDEX: 30 KG/M2 | HEART RATE: 66 BPM | TEMPERATURE: 97.1 F | WEIGHT: 163 LBS | HEIGHT: 62 IN | SYSTOLIC BLOOD PRESSURE: 110 MMHG | OXYGEN SATURATION: 96 % | DIASTOLIC BLOOD PRESSURE: 73 MMHG

## 2024-02-06 DIAGNOSIS — Z98.890 HISTORY OF ENDOSCOPIC SINUS SURGERY: ICD-10-CM

## 2024-02-06 DIAGNOSIS — E24.0 CUSHING DISEASE (HCC): ICD-10-CM

## 2024-02-06 DIAGNOSIS — J34.89 RHINORRHEA: Primary | ICD-10-CM

## 2024-02-06 DIAGNOSIS — Z86.018 HISTORY OF PITUITARY ADENOMA: ICD-10-CM

## 2024-02-06 PROCEDURE — 99213 OFFICE O/P EST LOW 20 MIN: CPT | Performed by: STUDENT IN AN ORGANIZED HEALTH CARE EDUCATION/TRAINING PROGRAM

## 2024-02-06 PROCEDURE — 31231 NASAL ENDOSCOPY DX: CPT | Performed by: STUDENT IN AN ORGANIZED HEALTH CARE EDUCATION/TRAINING PROGRAM

## 2024-02-06 PROCEDURE — G8419 CALC BMI OUT NRM PARAM NOF/U: HCPCS | Performed by: STUDENT IN AN ORGANIZED HEALTH CARE EDUCATION/TRAINING PROGRAM

## 2024-02-06 PROCEDURE — G8484 FLU IMMUNIZE NO ADMIN: HCPCS | Performed by: STUDENT IN AN ORGANIZED HEALTH CARE EDUCATION/TRAINING PROGRAM

## 2024-02-06 PROCEDURE — G8427 DOCREV CUR MEDS BY ELIG CLIN: HCPCS | Performed by: STUDENT IN AN ORGANIZED HEALTH CARE EDUCATION/TRAINING PROGRAM

## 2024-02-06 PROCEDURE — 1036F TOBACCO NON-USER: CPT | Performed by: STUDENT IN AN ORGANIZED HEALTH CARE EDUCATION/TRAINING PROGRAM

## 2024-02-06 NOTE — PROGRESS NOTES
[facility]  DIVISION OF OTOLARYNGOLOGY- HEAD & NECK SURGERY  CLINIC FOLLOW-UP VISIT      Patient Name: Yolis Kaiser  Medical Record Number:  [id number]  Primary Care Physician:  Wilfrid Henson MD    ChiefComplaint     Chief Complaint   Patient presents with    Follow-up     Still has drainage,        History of Present Illness     Yolis Kaiser is an 48 y.o. female previously seen for postnasal drainage, GERD, globus pharyngeus, LPR, tonsil stones.    Interval History:   She has had a recurrence of her Cushing's.  She follows with a neurosurgeon from [facility] who noted low flow CSF leak on an MRI from August 2023.  She last saw her neurosurgeon in January.  To treat her Cushing's she is contemplating another surgery versus radiation.  She had transsphenoidal surgery on 12/30/2020.  She states that she gets forehead headaches occasionally.  Headaches not necessarily worse with lying down.  Sometimes when leaning over her nose will drip, unsure of which side typically drips or if it is both sides.  She gets intermittent ear fullness and ringing that last approximately 20 seconds.  Sense of smell comes and goes.  Denies metallic or salty taste in her mouth.  She does have a history of epilepsy for which she follows with neurology at [facility].    Past Medical History     Past Medical History:   Diagnosis Date    Cushing syndrome (HCC)     Depression     Female pattern hair loss     H/O: pituitary tumor     Hypercorticism (HCC)     Insulin resistance     Obesity     PCOS (polycystic ovarian syndrome)     Seizures (HCC)     Spinal stenosis     L5       Past Surgical History     Past Surgical History:   Procedure Laterality Date    INCONTINENCE SURGERY      PATSY STEROTACTIC LOC BREAST BIOPSY LEFT Left 6/30/2023    PATSY STEROTACTIC LOC BREAST BIOPSY LEFT 6/30/2023 [facility]    OVARIAN CYST REMOVAL Right     PLANTAR FASCIA SURGERY Bilateral     TUMOR

## 2024-05-22 ENCOUNTER — HOSPITAL ENCOUNTER (OUTPATIENT)
Dept: WOMENS IMAGING | Age: 49
Discharge: HOME | End: 2024-05-22
Payer: COMMERCIAL

## 2024-05-22 VITALS — BODY MASS INDEX: 33.68 KG/M2 | HEIGHT: 62 IN | WEIGHT: 183 LBS

## 2024-05-22 DIAGNOSIS — Z12.31 VISIT FOR SCREENING MAMMOGRAM: ICD-10-CM

## 2024-05-22 PROCEDURE — 77063 BREAST TOMOSYNTHESIS BI: CPT

## 2024-06-14 ENCOUNTER — HOSPITAL ENCOUNTER (OUTPATIENT)
Dept: WOMENS IMAGING | Age: 49
Discharge: HOME | End: 2024-06-14
Payer: MEDICARE

## 2024-06-14 ENCOUNTER — HOSPITAL ENCOUNTER (OUTPATIENT)
Dept: ULTRASOUND IMAGING | Age: 49
Discharge: HOME | End: 2024-06-14
Payer: MEDICARE

## 2024-06-14 DIAGNOSIS — R92.8 ABNORMAL MAMMOGRAM: ICD-10-CM

## 2024-06-14 PROCEDURE — G0279 TOMOSYNTHESIS, MAMMO: HCPCS

## 2024-06-14 PROCEDURE — 76642 ULTRASOUND BREAST LIMITED: CPT

## 2024-07-03 ENCOUNTER — HOSPITAL ENCOUNTER (OUTPATIENT)
Age: 49
Discharge: HOME | End: 2024-07-03

## 2024-07-03 LAB
EKG ATRIAL RATE: 61 BPM
EKG DIAGNOSIS: NORMAL
EKG P AXIS: 36 DEGREES
EKG P-R INTERVAL: 150 MS
EKG Q-T INTERVAL: 390 MS
EKG QRS DURATION: 94 MS
EKG QTC CALCULATION (BAZETT): 392 MS
EKG R AXIS: 21 DEGREES
EKG T AXIS: 21 DEGREES
EKG VENTRICULAR RATE: 61 BPM

## 2024-11-22 ENCOUNTER — HOSPITAL ENCOUNTER (OUTPATIENT)
Age: 49
Setting detail: SPECIMEN
Discharge: HOME | End: 2024-11-22
Payer: COMMERCIAL

## 2024-11-22 PROCEDURE — 82533 TOTAL CORTISOL: CPT

## 2024-11-23 ENCOUNTER — HOSPITAL ENCOUNTER (OUTPATIENT)
Age: 49
Setting detail: SPECIMEN
Discharge: HOME | End: 2024-11-23
Payer: COMMERCIAL

## 2024-11-23 PROCEDURE — 82533 TOTAL CORTISOL: CPT

## 2024-11-24 ENCOUNTER — HOSPITAL ENCOUNTER (OUTPATIENT)
Age: 49
Setting detail: SPECIMEN
Discharge: HOME | End: 2024-11-24
Payer: COMMERCIAL

## 2024-11-24 PROCEDURE — 82533 TOTAL CORTISOL: CPT

## 2024-11-25 ENCOUNTER — HOSPITAL ENCOUNTER (OUTPATIENT)
Age: 49
Discharge: HOME | End: 2024-11-25
Payer: MEDICARE

## 2024-11-25 LAB
ALBUMIN SERPL-MCNC: 3.8 G/DL (ref 3.4–5)
ALBUMIN/GLOB SERPL: 1.7 {RATIO} (ref 1.1–2.2)
ALP SERPL-CCNC: 54 U/L (ref 40–129)
ALT SERPL-CCNC: 15 U/L (ref 10–40)
ANION GAP SERPL CALCULATED.3IONS-SCNC: 8 MMOL/L (ref 3–16)
AST SERPL-CCNC: 26 U/L (ref 15–37)
BILIRUB SERPL-MCNC: <0.2 MG/DL (ref 0–1)
BUN SERPL-MCNC: 9 MG/DL (ref 7–20)
CALCIUM SERPL-MCNC: 9.3 MG/DL (ref 8.3–10.6)
CHLORIDE SERPL-SCNC: 107 MMOL/L (ref 99–110)
CO2 SERPL-SCNC: 27 MMOL/L (ref 21–32)
CORTIS SERPL-MCNC: 6.2 UG/DL
CREAT 24H UR-MRATE: 1 G/24HR (ref 0.6–1.5)
CREAT SERPL-MCNC: 0.7 MG/DL (ref 0.6–1.1)
EKG ATRIAL RATE: 59 BPM
EKG DIAGNOSIS: NORMAL
EKG P AXIS: 46 DEGREES
EKG P-R INTERVAL: 176 MS
EKG Q-T INTERVAL: 392 MS
EKG QRS DURATION: 90 MS
EKG QTC CALCULATION (BAZETT): 388 MS
EKG R AXIS: 12 DEGREES
EKG T AXIS: 6 DEGREES
EKG VENTRICULAR RATE: 59 BPM
GFR SERPLBLD CREATININE-BSD FMLA CKD-EPI: >90 ML/MIN/{1.73_M2}
GLUCOSE SERPL-MCNC: 78 MG/DL (ref 70–99)
POTASSIUM SERPL-SCNC: 4.2 MMOL/L (ref 3.5–5.1)
PROT SERPL-MCNC: 6.1 G/DL (ref 6.4–8.2)
SODIUM SERPL-SCNC: 142 MMOL/L (ref 136–145)
SPECIMEN VOL 24H UR: 1350 ML

## 2024-11-25 PROCEDURE — 93005 ELECTROCARDIOGRAM TRACING: CPT

## 2024-11-25 PROCEDURE — 80053 COMPREHEN METABOLIC PANEL: CPT

## 2024-11-25 PROCEDURE — 36415 COLL VENOUS BLD VENIPUNCTURE: CPT

## 2024-11-25 PROCEDURE — 82530 CORTISOL FREE: CPT

## 2024-11-25 PROCEDURE — 82570 ASSAY OF URINE CREATININE: CPT

## 2024-11-25 PROCEDURE — 82533 TOTAL CORTISOL: CPT

## 2024-11-27 LAB
COLLECT DURATION TIME SPEC: 24 H
CORTIS F 24H UR HPLC-MCNC: 2.32 UG/L
CORTIS F 24H UR-MRATE: 3.1 UG/D
CORTIS F/CREAT 24H UR: 2.94 UG/G CRT
CREAT 24H UR-MCNC: 79 MG/DL
CREAT 24H UR-MRATE: 1066 MG/D (ref 700–1600)
IMP & REVIEW OF LAB RESULTS: NORMAL
SPECIMEN VOL ?TM UR: 1350 ML

## 2024-11-28 LAB
CORTIS SAL-MCNC: <0.025 UG/DL

## 2024-12-13 ENCOUNTER — HOSPITAL ENCOUNTER (OUTPATIENT)
Age: 49
Discharge: HOME | End: 2024-12-13
Payer: MEDICARE

## 2024-12-13 LAB
ALBUMIN SERPL-MCNC: 3.7 G/DL (ref 3.4–5)
ALBUMIN/GLOB SERPL: 1.6 {RATIO} (ref 1.1–2.2)
ALP SERPL-CCNC: 56 U/L (ref 40–129)
ALT SERPL-CCNC: 15 U/L (ref 10–40)
ANION GAP SERPL CALCULATED.3IONS-SCNC: 10 MMOL/L (ref 3–16)
AST SERPL-CCNC: 27 U/L (ref 15–37)
BILIRUB SERPL-MCNC: <0.2 MG/DL (ref 0–1)
BUN SERPL-MCNC: 9 MG/DL (ref 7–20)
CALCIUM SERPL-MCNC: 9 MG/DL (ref 8.3–10.6)
CHLORIDE SERPL-SCNC: 105 MMOL/L (ref 99–110)
CO2 SERPL-SCNC: 26 MMOL/L (ref 21–32)
CORTIS AM PEAK SERPL-MCNC: 6.2 UG/DL (ref 4.3–22.4)
CREAT SERPL-MCNC: 0.7 MG/DL (ref 0.6–1.1)
GFR SERPLBLD CREATININE-BSD FMLA CKD-EPI: >90 ML/MIN/{1.73_M2}
GLUCOSE SERPL-MCNC: 82 MG/DL (ref 70–99)
POTASSIUM SERPL-SCNC: 3.7 MMOL/L (ref 3.5–5.1)
PROT SERPL-MCNC: 6 G/DL (ref 6.4–8.2)
SODIUM SERPL-SCNC: 141 MMOL/L (ref 136–145)
T4 FREE SERPL-MCNC: 1 NG/DL (ref 0.9–1.8)
TSH SERPL DL<=0.005 MIU/L-ACNC: 2.81 UIU/ML (ref 0.27–4.2)

## 2024-12-13 PROCEDURE — 84443 ASSAY THYROID STIM HORMONE: CPT

## 2024-12-13 PROCEDURE — 84439 ASSAY OF FREE THYROXINE: CPT

## 2024-12-13 PROCEDURE — 36415 COLL VENOUS BLD VENIPUNCTURE: CPT

## 2024-12-13 PROCEDURE — 82533 TOTAL CORTISOL: CPT

## 2024-12-13 PROCEDURE — 80053 COMPREHEN METABOLIC PANEL: CPT

## 2024-12-27 ENCOUNTER — HOSPITAL ENCOUNTER (OUTPATIENT)
Age: 49
Discharge: HOME | End: 2024-12-27
Payer: MEDICARE

## 2024-12-27 LAB — CORTIS AM PEAK SERPL-MCNC: 5.8 UG/DL (ref 4.3–22.4)

## 2024-12-27 PROCEDURE — 82533 TOTAL CORTISOL: CPT

## 2024-12-27 PROCEDURE — 36415 COLL VENOUS BLD VENIPUNCTURE: CPT

## 2025-01-24 ENCOUNTER — HOSPITAL ENCOUNTER (OUTPATIENT)
Age: 50
Discharge: HOME | End: 2025-01-24
Payer: MEDICARE

## 2025-01-24 LAB — CORTIS AM PEAK SERPL-MCNC: 9.6 UG/DL (ref 4.3–22.4)

## 2025-01-24 PROCEDURE — 82533 TOTAL CORTISOL: CPT

## 2025-01-24 PROCEDURE — 36415 COLL VENOUS BLD VENIPUNCTURE: CPT

## 2025-01-31 ENCOUNTER — TRANSCRIBE ORDERS (OUTPATIENT)
Dept: ADMINISTRATIVE | Age: 50
End: 2025-01-31

## 2025-01-31 DIAGNOSIS — R20.2 PARESTHESIA: Primary | ICD-10-CM

## 2025-01-31 DIAGNOSIS — G51.9 DISORDER OF SEVENTH CRANIAL NERVE: ICD-10-CM

## 2025-02-21 ENCOUNTER — HOSPITAL ENCOUNTER (OUTPATIENT)
Age: 50
Discharge: HOME | End: 2025-02-21
Payer: MEDICARE

## 2025-02-21 LAB
CORTIS SERPL-MCNC: 10.4 UG/DL
ESTRADIOL SERPL-MCNC: 64 PG/ML
FSH SERPL-ACNC: 10.3 MIU/ML
LH SERPL-ACNC: 5.2 MIU/ML
PROLACTIN SERPL IA-MCNC: 11.4 NG/ML
T4 FREE SERPL-MCNC: 1.3 NG/DL (ref 0.9–1.8)
TSH SERPL DL<=0.005 MIU/L-ACNC: 2.33 UIU/ML (ref 0.27–4.2)

## 2025-02-21 PROCEDURE — 83001 ASSAY OF GONADOTROPIN (FSH): CPT

## 2025-02-21 PROCEDURE — 83003 ASSAY GROWTH HORMONE (HGH): CPT

## 2025-02-21 PROCEDURE — 84305 ASSAY OF SOMATOMEDIN: CPT

## 2025-02-21 PROCEDURE — 36415 COLL VENOUS BLD VENIPUNCTURE: CPT

## 2025-02-21 PROCEDURE — 82533 TOTAL CORTISOL: CPT

## 2025-02-21 PROCEDURE — 84443 ASSAY THYROID STIM HORMONE: CPT

## 2025-02-21 PROCEDURE — 82670 ASSAY OF TOTAL ESTRADIOL: CPT

## 2025-02-21 PROCEDURE — 84146 ASSAY OF PROLACTIN: CPT

## 2025-02-21 PROCEDURE — 84439 ASSAY OF FREE THYROXINE: CPT

## 2025-02-21 PROCEDURE — 83002 ASSAY OF GONADOTROPIN (LH): CPT

## 2025-02-23 LAB
GHRH SERPL-MCNC: 1.23 NG/ML (ref 0.05–8)
IGF-I SERPL-MCNC: 147 NG/ML (ref 62–243)
IGF-I Z-SCORE SERPL: 0.3

## 2025-02-24 ENCOUNTER — HOSPITAL ENCOUNTER (OUTPATIENT)
Age: 50
Setting detail: SPECIMEN
Discharge: HOME | End: 2025-02-24

## 2025-02-24 ENCOUNTER — HOSPITAL ENCOUNTER (OUTPATIENT)
Age: 50
Setting detail: SPECIMEN
Discharge: HOME | End: 2025-02-24
Payer: MEDICARE

## 2025-02-24 PROCEDURE — 82533 TOTAL CORTISOL: CPT

## 2025-02-25 ENCOUNTER — HOSPITAL ENCOUNTER (OUTPATIENT)
Age: 50
Setting detail: SPECIMEN
Discharge: HOME | End: 2025-02-25
Payer: MEDICARE

## 2025-02-25 PROCEDURE — 82533 TOTAL CORTISOL: CPT

## 2025-02-26 ENCOUNTER — HOSPITAL ENCOUNTER (OUTPATIENT)
Age: 50
Discharge: HOME | End: 2025-02-26
Payer: MEDICARE

## 2025-02-26 LAB
ACTH PLAS-MCNC: 59 PG/ML (ref 7–63)
CORTIS AM PEAK SERPL-MCNC: 0.9 UG/DL (ref 4.3–22.4)

## 2025-02-26 PROCEDURE — 80299 QUANTITATIVE ASSAY DRUG: CPT

## 2025-02-26 PROCEDURE — 82533 TOTAL CORTISOL: CPT

## 2025-02-26 PROCEDURE — 36415 COLL VENOUS BLD VENIPUNCTURE: CPT

## 2025-03-01 LAB — DEXAMETHASONE SERPL-MCNC: 480.9 NG/DL

## 2025-03-04 LAB
CORTIS SAL-MCNC: <0.025 UG/DL
CORTIS SAL-MCNC: <0.025 UG/DL

## 2025-04-15 ENCOUNTER — HOSPITAL ENCOUNTER (OUTPATIENT)
Age: 50
Discharge: HOME | End: 2025-04-15
Payer: MEDICARE

## 2025-04-15 LAB — CORTIS AM PEAK SERPL-MCNC: 1 UG/DL (ref 4.3–22.4)

## 2025-04-15 PROCEDURE — 80299 QUANTITATIVE ASSAY DRUG: CPT

## 2025-04-15 PROCEDURE — 36415 COLL VENOUS BLD VENIPUNCTURE: CPT

## 2025-04-15 PROCEDURE — 82533 TOTAL CORTISOL: CPT

## 2025-04-19 ENCOUNTER — HOSPITAL ENCOUNTER (OUTPATIENT)
Age: 50
Setting detail: SPECIMEN
Discharge: HOME | End: 2025-04-19
Payer: MEDICARE

## 2025-04-19 PROCEDURE — 82533 TOTAL CORTISOL: CPT

## 2025-04-20 ENCOUNTER — HOSPITAL ENCOUNTER (OUTPATIENT)
Age: 50
Setting detail: SPECIMEN
Discharge: HOME | End: 2025-04-20
Payer: MEDICARE

## 2025-04-20 PROCEDURE — 82533 TOTAL CORTISOL: CPT

## 2025-04-21 ENCOUNTER — HOSPITAL ENCOUNTER (OUTPATIENT)
Age: 50
Discharge: HOME | End: 2025-04-21

## 2025-04-21 LAB — DEXAMETHASONE SERPL-MCNC: 330.1 NG/DL

## 2025-04-24 LAB — CORTIS SAL-MCNC: <0.025 UG/DL

## 2025-04-26 LAB — CORTIS SAL-MCNC: <0.025 UG/DL

## 2025-05-09 ENCOUNTER — APPOINTMENT (OUTPATIENT)
Dept: GENERAL RADIOLOGY | Age: 50
End: 2025-05-09
Payer: MEDICARE

## 2025-05-09 ENCOUNTER — HOSPITAL ENCOUNTER (EMERGENCY)
Age: 50
Discharge: HOME | End: 2025-05-09
Payer: MEDICARE

## 2025-05-09 ENCOUNTER — HOSPITAL ENCOUNTER (OUTPATIENT)
Age: 50
Discharge: HOME | End: 2025-05-09
Payer: MEDICARE

## 2025-05-09 VITALS
WEIGHT: 180 LBS | BODY MASS INDEX: 33.13 KG/M2 | SYSTOLIC BLOOD PRESSURE: 129 MMHG | RESPIRATION RATE: 16 BRPM | TEMPERATURE: 98.2 F | DIASTOLIC BLOOD PRESSURE: 64 MMHG | HEART RATE: 57 BPM | HEIGHT: 62 IN | OXYGEN SATURATION: 100 %
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
## 2025-05-09 DIAGNOSIS — M25.561 ACUTE PAIN OF RIGHT KNEE: Primary | ICD-10-CM

## 2025-05-09 LAB — CORTIS AM PEAK SERPL-MCNC: 11.1 UG/DL (ref 4.3–22.4)

## 2025-05-09 PROCEDURE — 73562 X-RAY EXAM OF KNEE 3: CPT

## 2025-05-09 PROCEDURE — 36415 COLL VENOUS BLD VENIPUNCTURE: CPT

## 2025-05-09 PROCEDURE — 6370000000 HC RX 637 (ALT 250 FOR IP): Performed by: PHYSICIAN ASSISTANT

## 2025-05-09 PROCEDURE — 99283 EMERGENCY DEPT VISIT LOW MDM: CPT

## 2025-05-09 PROCEDURE — 82533 TOTAL CORTISOL: CPT

## 2025-05-09 RX ORDER — ACETAMINOPHEN 325 MG/1
650 TABLET ORAL ONCE
Status: COMPLETED | OUTPATIENT
Start: 2025-05-09 | End: 2025-05-09

## 2025-05-09 RX ADMIN — ACETAMINOPHEN 650 MG: 325 TABLET ORAL at 19:25

## 2025-05-09 ASSESSMENT — PAIN DESCRIPTION - ORIENTATION
ORIENTATION: RIGHT
ORIENTATION: RIGHT

## 2025-05-09 ASSESSMENT — ENCOUNTER SYMPTOMS
BACK PAIN: 0
ABDOMINAL PAIN: 0
NAUSEA: 0
VOMITING: 0
EYE DISCHARGE: 0
RHINORRHEA: 0
COUGH: 0
STRIDOR: 0
DIARRHEA: 0
EYE REDNESS: 0
SHORTNESS OF BREATH: 0
EYE ITCHING: 0
SORE THROAT: 0

## 2025-05-09 ASSESSMENT — PAIN DESCRIPTION - LOCATION
LOCATION: KNEE
LOCATION: KNEE

## 2025-05-09 ASSESSMENT — LIFESTYLE VARIABLES
HOW MANY STANDARD DRINKS CONTAINING ALCOHOL DO YOU HAVE ON A TYPICAL DAY: 1 OR 2
HOW OFTEN DO YOU HAVE A DRINK CONTAINING ALCOHOL: MONTHLY OR LESS

## 2025-05-09 ASSESSMENT — PAIN - FUNCTIONAL ASSESSMENT: PAIN_FUNCTIONAL_ASSESSMENT: 0-10

## 2025-05-09 ASSESSMENT — PAIN SCALES - GENERAL
PAINLEVEL_OUTOF10: 8
PAINLEVEL_OUTOF10: 8

## 2025-05-10 NOTE — ED PROVIDER NOTES
[facility] EMERGENCY DEPARTMENT  EMERGENCY DEPARTMENT ENCOUNTER        Pt Name: Yolis Kaiser  MRN: [id number]  Birthdate 1975  Date of evaluation: 5/9/2025  Provider: SUMAN Hdz  PCP: Wilfrid Henson MD  Note Started: 11:08 PM EDT 5/9/25      NONI. I have evaluated this patient.        CHIEF COMPLAINT       Chief Complaint   Patient presents with    Knee Injury     Pt presents with a right knee injury that happened when she tripped over the back of a metal trailer.        HISTORY OF PRESENT ILLNESS: 1 or more Elements     History From: Patient,   Limitations to history : None    Yolis Kaiser is a 49 y.o. female who presents to the emergency department for evaluation of right knee pain.  Patient tripped over a metal grate, and hit her right knee directly on the grate she reports significant pain with weightbearing.  She denies prior history of injury to this joint.  She did not hit her head or lose consciousness.  She denies any other acute concerns.    Nursing Notes were all reviewed and agreed with or any disagreements were addressed in the HPI.    REVIEW OF SYSTEMS :      Review of Systems   Constitutional:  Negative for chills, diaphoresis and fever.   HENT:  Negative for congestion, rhinorrhea and sore throat.    Eyes:  Negative for discharge, redness and itching.   Respiratory:  Negative for cough, shortness of breath and stridor.    Cardiovascular:  Negative for chest pain and palpitations.   Gastrointestinal:  Negative for abdominal pain, diarrhea, nausea and vomiting.   Genitourinary:  Negative for dysuria and hematuria.   Musculoskeletal:  Positive for arthralgias. Negative for back pain and joint swelling.   Skin:  Negative for rash.   Neurological:  Negative for syncope, speech difficulty and headaches.       Positives and Pertinent negatives as per HPI.     SURGICAL HISTORY     Past Surgical History:   Procedure Laterality Date    INCONTINENCE  tenderness anteriorly.  No gross deformity.  No effusion, ecchymosis or edema.  Neurovascularly intact distally.   Skin:     General: Skin is warm and dry.      Capillary Refill: Capillary refill takes less than 2 seconds.   Neurological:      General: No focal deficit present.      Mental Status: She is alert and oriented to person, place, and time.      Sensory: No sensory deficit.      Motor: No weakness.   Psychiatric:         Mood and Affect: Mood normal.         Behavior: Behavior normal.             DIAGNOSTIC RESULTS   LABS:    Labs Reviewed - No data to display    When ordered only abnormal lab results are displayed. All other labs were within normal range or not returned as of this dictation.    EKG: When ordered, EKG's are interpreted by the Emergency Department Physician in the absence of a cardiologist.  Please see their note for interpretation of EKG.    RADIOLOGY:   Non-plain film images such as CT, Ultrasound and MRI are read by the radiologist.     Interpretation per the Radiologist below, if available at the time of this note:    XR KNEE RIGHT (3 VIEWS)   Final Result   No acute fracture or dislocation.           XR KNEE RIGHT (3 VIEWS)  Result Date: 5/9/2025  EXAMINATION: THREE XRAY VIEWS OF THE RIGHT KNEE 5/9/2025 6:31 pm COMPARISON: None. HISTORY: ORDERING SYSTEM PROVIDED HISTORY: fall, landed directly on knee, anterior pain TECHNOLOGIST PROVIDED HISTORY: Reason for exam:->fall, landed directly on knee, anterior pain Reason for Exam: fall, landed directly onto knee, anterior pain FINDINGS: Mineralization appears normal.  There is no joint effusion.  The joint spaces and soft tissues are unremarkable.  No fracture, dislocation or focal bone lesion is identified.     No acute fracture or dislocation.         ED Provider US Interpretation.    No results found.    PROCEDURES   Unless otherwise noted below, none     Procedures      CRITICAL CARE TIME (.cctime)       PAST MEDICAL HISTORY      has a

## 2025-05-22 ENCOUNTER — HOSPITAL ENCOUNTER (OUTPATIENT)
Dept: WOMENS IMAGING | Age: 50
Discharge: HOME | End: 2025-05-22
Payer: MEDICARE

## 2025-05-22 VITALS — BODY MASS INDEX: 33.13 KG/M2 | HEIGHT: 62 IN | WEIGHT: 180 LBS

## 2025-05-22 DIAGNOSIS — Z12.31 ENCOUNTER FOR SCREENING MAMMOGRAM FOR BREAST CANCER: ICD-10-CM

## 2025-05-22 PROCEDURE — 77063 BREAST TOMOSYNTHESIS BI: CPT

## 2025-06-02 ENCOUNTER — HOSPITAL ENCOUNTER (OUTPATIENT)
Age: 50
Setting detail: SPECIMEN
Discharge: HOME | End: 2025-06-02
Payer: MEDICARE

## 2025-06-02 PROCEDURE — 82533 TOTAL CORTISOL: CPT

## 2025-06-04 ENCOUNTER — HOSPITAL ENCOUNTER (OUTPATIENT)
Age: 50
Setting detail: SPECIMEN
Discharge: HOME | End: 2025-06-04
Payer: MEDICARE

## 2025-06-04 PROCEDURE — 82533 TOTAL CORTISOL: CPT

## 2025-06-05 ENCOUNTER — HOSPITAL ENCOUNTER (OUTPATIENT)
Age: 50
Setting detail: SPECIMEN
Discharge: HOME | End: 2025-06-05
Payer: MEDICARE

## 2025-06-05 PROCEDURE — 82533 TOTAL CORTISOL: CPT

## 2025-06-06 ENCOUNTER — HOSPITAL ENCOUNTER (OUTPATIENT)
Age: 50
Discharge: HOME | End: 2025-06-06

## 2025-06-10 LAB
CORTIS SAL-MCNC: <0.025 UG/DL

## 2025-06-13 ENCOUNTER — HOSPITAL ENCOUNTER (OUTPATIENT)
Age: 50
Discharge: HOME | End: 2025-06-13
Payer: MEDICARE

## 2025-06-13 LAB — CORTIS SERPL-MCNC: 1 UG/DL

## 2025-06-13 PROCEDURE — 80299 QUANTITATIVE ASSAY DRUG: CPT

## 2025-06-13 PROCEDURE — 36415 COLL VENOUS BLD VENIPUNCTURE: CPT

## 2025-06-13 PROCEDURE — 82533 TOTAL CORTISOL: CPT

## 2025-06-13 PROCEDURE — 82024 ASSAY OF ACTH: CPT

## 2025-06-16 LAB — DEXAMETHASONE SERPL-MCNC: 287 NG/DL

## 2025-06-18 LAB — ACTH PLAS-MCNC: 13 PG/ML (ref 7–63)
